# Patient Record
Sex: FEMALE | Race: WHITE | Employment: OTHER | URBAN - METROPOLITAN AREA
[De-identification: names, ages, dates, MRNs, and addresses within clinical notes are randomized per-mention and may not be internally consistent; named-entity substitution may affect disease eponyms.]

---

## 2017-03-30 ENCOUNTER — TELEPHONE (OUTPATIENT)
Dept: FAMILY MEDICINE CLINIC | Age: 72
End: 2017-03-30

## 2017-04-06 RX ORDER — AMLODIPINE BESYLATE 5 MG/1
TABLET ORAL
Qty: 90 TABLET | Refills: 0 | Status: SHIPPED | OUTPATIENT
Start: 2017-04-06 | End: 2017-04-13 | Stop reason: SDUPTHER

## 2017-04-13 ENCOUNTER — TELEPHONE (OUTPATIENT)
Dept: FAMILY MEDICINE CLINIC | Age: 72
End: 2017-04-13

## 2017-04-13 ENCOUNTER — OFFICE VISIT (OUTPATIENT)
Dept: FAMILY MEDICINE CLINIC | Age: 72
End: 2017-04-13

## 2017-04-13 VITALS
HEIGHT: 65 IN | BODY MASS INDEX: 34.16 KG/M2 | TEMPERATURE: 98.2 F | WEIGHT: 205 LBS | DIASTOLIC BLOOD PRESSURE: 76 MMHG | HEART RATE: 80 BPM | RESPIRATION RATE: 18 BRPM | SYSTOLIC BLOOD PRESSURE: 118 MMHG

## 2017-04-13 DIAGNOSIS — Z12.11 COLON CANCER SCREENING: ICD-10-CM

## 2017-04-13 DIAGNOSIS — E78.5 HYPERLIPIDEMIA, UNSPECIFIED HYPERLIPIDEMIA TYPE: ICD-10-CM

## 2017-04-13 DIAGNOSIS — I10 ESSENTIAL HYPERTENSION: Primary | Chronic | ICD-10-CM

## 2017-04-13 DIAGNOSIS — Z11.59 NEED FOR HEPATITIS C SCREENING TEST: ICD-10-CM

## 2017-04-13 DIAGNOSIS — C44.91 BCC (BASAL CELL CARCINOMA OF SKIN): ICD-10-CM

## 2017-04-13 PROCEDURE — 1036F TOBACCO NON-USER: CPT | Performed by: FAMILY MEDICINE

## 2017-04-13 PROCEDURE — G8400 PT W/DXA NO RESULTS DOC: HCPCS | Performed by: FAMILY MEDICINE

## 2017-04-13 PROCEDURE — 3014F SCREEN MAMMO DOC REV: CPT | Performed by: FAMILY MEDICINE

## 2017-04-13 PROCEDURE — 1123F ACP DISCUSS/DSCN MKR DOCD: CPT | Performed by: FAMILY MEDICINE

## 2017-04-13 PROCEDURE — 99213 OFFICE O/P EST LOW 20 MIN: CPT | Performed by: FAMILY MEDICINE

## 2017-04-13 PROCEDURE — 3017F COLORECTAL CA SCREEN DOC REV: CPT | Performed by: FAMILY MEDICINE

## 2017-04-13 PROCEDURE — 1090F PRES/ABSN URINE INCON ASSESS: CPT | Performed by: FAMILY MEDICINE

## 2017-04-13 PROCEDURE — 4040F PNEUMOC VAC/ADMIN/RCVD: CPT | Performed by: FAMILY MEDICINE

## 2017-04-13 PROCEDURE — G8427 DOCREV CUR MEDS BY ELIG CLIN: HCPCS | Performed by: FAMILY MEDICINE

## 2017-04-13 PROCEDURE — G8417 CALC BMI ABV UP PARAM F/U: HCPCS | Performed by: FAMILY MEDICINE

## 2017-04-13 RX ORDER — AMLODIPINE BESYLATE 5 MG/1
TABLET ORAL
Qty: 90 TABLET | Refills: 3 | Status: SHIPPED | OUTPATIENT
Start: 2017-04-13 | End: 2018-03-26 | Stop reason: SDUPTHER

## 2017-04-13 RX ORDER — INFLUENZA A VIRUS A/MICHIGAN/45/2015 X-275 (H1N1) ANTIGEN (FORMALDEHYDE INACTIVATED), INFLUENZA A VIRUS A/SINGAPORE/INFIMH-16-0019/2016 IVR-186 (H3N2) ANTIGEN (FORMALDEHYDE INACTIVATED), AND INFLUENZA B VIRUS B/MARYLAND/15/2016 BX-69A (A B/COLORADO/6/2017-LIKE VIRUS) ANTIGEN (FORMALDEHYDE INACTIVATED) 60; 60; 60 UG/.5ML; UG/.5ML; UG/.5ML
0.5 INJECTION, SUSPENSION INTRAMUSCULAR ONCE
Refills: 0 | COMMUNITY
Start: 2017-01-23 | End: 2018-03-26 | Stop reason: ALTCHOICE

## 2017-04-13 RX ORDER — PREDNISONE 20 MG/1
40 TABLET ORAL DAILY
Qty: 10 TABLET | Refills: 0 | Status: SHIPPED | OUTPATIENT
Start: 2017-04-13 | End: 2017-04-18

## 2017-04-13 RX ORDER — ATORVASTATIN CALCIUM 20 MG/1
TABLET, FILM COATED ORAL
Qty: 90 TABLET | Refills: 3 | Status: SHIPPED | OUTPATIENT
Start: 2017-04-13 | End: 2018-03-26 | Stop reason: SDUPTHER

## 2017-04-13 RX ORDER — BUSPIRONE HYDROCHLORIDE 15 MG/1
15 TABLET ORAL 2 TIMES DAILY
Qty: 180 TABLET | Refills: 3 | Status: SHIPPED | OUTPATIENT
Start: 2017-04-13 | End: 2018-03-26

## 2017-04-13 RX ORDER — LOSARTAN POTASSIUM 50 MG/1
TABLET ORAL
Qty: 90 TABLET | Refills: 3 | Status: SHIPPED | OUTPATIENT
Start: 2017-04-13 | End: 2018-03-26 | Stop reason: SDUPTHER

## 2017-04-13 RX ORDER — BUPROPION HYDROCHLORIDE 300 MG/1
TABLET ORAL
Qty: 90 TABLET | Refills: 3 | Status: SHIPPED | OUTPATIENT
Start: 2017-04-13 | End: 2018-03-26

## 2017-04-13 RX ORDER — PANTOPRAZOLE SODIUM 40 MG/1
40 TABLET, DELAYED RELEASE ORAL DAILY
Qty: 90 TABLET | Refills: 3 | Status: SHIPPED | OUTPATIENT
Start: 2017-04-13 | End: 2018-03-26 | Stop reason: SDUPTHER

## 2017-04-13 RX ORDER — DULOXETIN HYDROCHLORIDE 60 MG/1
CAPSULE, DELAYED RELEASE ORAL
Qty: 180 CAPSULE | Refills: 3 | Status: SHIPPED | OUTPATIENT
Start: 2017-04-13 | End: 2018-03-26 | Stop reason: SDUPTHER

## 2017-04-20 ASSESSMENT — ENCOUNTER SYMPTOMS
RHINORRHEA: 0
CHEST TIGHTNESS: 0
BACK PAIN: 0
SORE THROAT: 0
COUGH: 0
EYES NEGATIVE: 1
SHORTNESS OF BREATH: 0
NAUSEA: 0
DIARRHEA: 0
ABDOMINAL PAIN: 0
VOMITING: 0

## 2017-04-29 LAB
ABSOLUTE BASO #: 0.1 K/UL (ref 0–0.1)
ABSOLUTE EOS #: 0.3 K/UL (ref 0.1–0.4)
ABSOLUTE LYMPH #: 1.4 K/UL (ref 0.8–5.2)
ABSOLUTE MONO #: 0.5 K/UL (ref 0.1–0.9)
ABSOLUTE NEUT #: 9.8 K/UL (ref 1.3–9.1)
ALBUMIN SERPL-MCNC: 4.1 G/DL (ref 3.2–5.3)
ALK PHOSPHATASE: 105 IU/L (ref 35–121)
ALT SERPL-CCNC: 20 IU/L (ref 5–59)
ANION GAP SERPL CALCULATED.3IONS-SCNC: 12 MMOL/L
AST SERPL-CCNC: 15 IU/L (ref 10–42)
BASOPHILS RELATIVE PERCENT: 0.6 %
BILIRUB SERPL-MCNC: 0.5 MG/DL (ref 0.2–1.3)
BUN BLDV-MCNC: 18 MG/DL (ref 10–20)
CALCIUM SERPL-MCNC: 9.2 MG/DL (ref 8.7–10.8)
CHLORIDE BLD-SCNC: 103 MMOL/L (ref 95–111)
CHOLESTEROL/HDL RATIO: 3.3
CHOLESTEROL: 189 MG/DL
CO2: 30 MMOL/L (ref 21–32)
CREAT SERPL-MCNC: 1 MG/DL (ref 0.5–1.3)
EGFR AFRICAN AMERICAN: 67
EGFR IF NONAFRICAN AMERICAN: 55
EOSINOPHILS RELATIVE PERCENT: 2.2 %
GLUCOSE: 112 MG/DL (ref 70–100)
HCT VFR BLD CALC: 45.6 % (ref 36–48)
HDLC SERPL-MCNC: 57 MG/DL (ref 40–60)
HEMOGLOBIN: 15 G/DL (ref 12–16)
HEPATITIS C ANTIBODY: NEGATIVE
LDL CHOLESTEROL CALCULATED: 109 MG/DL
LDL/HDL RATIO: 1.9
LYMPHOCYTE %: 11.2 %
MCH RBC QN AUTO: 28.5 PG (ref 27–34)
MCHC RBC AUTO-ENTMCNC: 32.9 G/DL (ref 31–36)
MCV RBC AUTO: 86.7 FL (ref 80–100)
MONOCYTES # BLD: 4.2 %
NEUTROPHILS RELATIVE PERCENT: 81.3 %
PDW BLD-RTO: 13.6 % (ref 10.8–14.8)
PLATELETS: 225 K/UL (ref 150–450)
POTASSIUM SERPL-SCNC: 4.4 MMOL/L (ref 3.5–5.4)
RBC: 5.26 M/UL (ref 4–5.5)
SODIUM BLD-SCNC: 141 MMOL/L (ref 134–147)
TOTAL PROTEIN: 6.9 G/DL (ref 5.8–8)
TRIGL SERPL-MCNC: 116 MG/DL
VLDLC SERPL CALC-MCNC: 23 MG/DL
WBC: 12 K/UL (ref 3.7–10.8)

## 2017-05-02 ENCOUNTER — TELEPHONE (OUTPATIENT)
Dept: FAMILY MEDICINE CLINIC | Age: 72
End: 2017-05-02

## 2017-05-02 DIAGNOSIS — Z12.11 COLON CANCER SCREENING: ICD-10-CM

## 2017-05-02 LAB
CONTROL: ABNORMAL
HEMOCCULT STL QL: POSITIVE

## 2017-05-02 PROCEDURE — 82274 ASSAY TEST FOR BLOOD FECAL: CPT | Performed by: FAMILY MEDICINE

## 2017-05-03 ENCOUNTER — TELEPHONE (OUTPATIENT)
Dept: FAMILY MEDICINE CLINIC | Age: 72
End: 2017-05-03

## 2017-05-03 DIAGNOSIS — R19.5 POSITIVE FIT (FECAL IMMUNOCHEMICAL TEST): Primary | ICD-10-CM

## 2018-03-26 ENCOUNTER — OFFICE VISIT (OUTPATIENT)
Dept: FAMILY MEDICINE CLINIC | Age: 73
End: 2018-03-26
Payer: MEDICARE

## 2018-03-26 VITALS
DIASTOLIC BLOOD PRESSURE: 58 MMHG | HEART RATE: 96 BPM | WEIGHT: 190.2 LBS | TEMPERATURE: 98 F | BODY MASS INDEX: 32.47 KG/M2 | RESPIRATION RATE: 22 BRPM | HEIGHT: 64 IN | SYSTOLIC BLOOD PRESSURE: 106 MMHG

## 2018-03-26 DIAGNOSIS — E78.5 HYPERLIPIDEMIA, UNSPECIFIED HYPERLIPIDEMIA TYPE: ICD-10-CM

## 2018-03-26 DIAGNOSIS — I10 ESSENTIAL HYPERTENSION: Chronic | ICD-10-CM

## 2018-03-26 DIAGNOSIS — B96.89 ACUTE BACTERIAL SINUSITIS: Primary | ICD-10-CM

## 2018-03-26 DIAGNOSIS — F33.42 RECURRENT MAJOR DEPRESSIVE EPISODES, IN FULL REMISSION (HCC): ICD-10-CM

## 2018-03-26 DIAGNOSIS — J01.90 ACUTE BACTERIAL SINUSITIS: Primary | ICD-10-CM

## 2018-03-26 PROCEDURE — 1090F PRES/ABSN URINE INCON ASSESS: CPT | Performed by: FAMILY MEDICINE

## 2018-03-26 PROCEDURE — 1036F TOBACCO NON-USER: CPT | Performed by: FAMILY MEDICINE

## 2018-03-26 PROCEDURE — G8427 DOCREV CUR MEDS BY ELIG CLIN: HCPCS | Performed by: FAMILY MEDICINE

## 2018-03-26 PROCEDURE — 3017F COLORECTAL CA SCREEN DOC REV: CPT | Performed by: FAMILY MEDICINE

## 2018-03-26 PROCEDURE — 3014F SCREEN MAMMO DOC REV: CPT | Performed by: FAMILY MEDICINE

## 2018-03-26 PROCEDURE — 99213 OFFICE O/P EST LOW 20 MIN: CPT | Performed by: FAMILY MEDICINE

## 2018-03-26 PROCEDURE — G8482 FLU IMMUNIZE ORDER/ADMIN: HCPCS | Performed by: FAMILY MEDICINE

## 2018-03-26 PROCEDURE — G8400 PT W/DXA NO RESULTS DOC: HCPCS | Performed by: FAMILY MEDICINE

## 2018-03-26 PROCEDURE — 1123F ACP DISCUSS/DSCN MKR DOCD: CPT | Performed by: FAMILY MEDICINE

## 2018-03-26 PROCEDURE — G8417 CALC BMI ABV UP PARAM F/U: HCPCS | Performed by: FAMILY MEDICINE

## 2018-03-26 PROCEDURE — 4040F PNEUMOC VAC/ADMIN/RCVD: CPT | Performed by: FAMILY MEDICINE

## 2018-03-26 RX ORDER — PANTOPRAZOLE SODIUM 40 MG/1
40 TABLET, DELAYED RELEASE ORAL DAILY
Qty: 90 TABLET | Refills: 3 | Status: SHIPPED | OUTPATIENT
Start: 2018-03-26 | End: 2019-02-14 | Stop reason: SDUPTHER

## 2018-03-26 RX ORDER — ATORVASTATIN CALCIUM 20 MG/1
TABLET, FILM COATED ORAL
Qty: 90 TABLET | Refills: 3 | Status: SHIPPED | OUTPATIENT
Start: 2018-03-26 | End: 2019-02-14 | Stop reason: SDUPTHER

## 2018-03-26 RX ORDER — LOSARTAN POTASSIUM 50 MG/1
TABLET ORAL
Qty: 90 TABLET | Refills: 3 | Status: SHIPPED | OUTPATIENT
Start: 2018-03-26 | End: 2019-02-14 | Stop reason: SDUPTHER

## 2018-03-26 RX ORDER — AMLODIPINE BESYLATE 5 MG/1
TABLET ORAL
Qty: 90 TABLET | Refills: 3 | Status: SHIPPED | OUTPATIENT
Start: 2018-03-26 | End: 2019-02-14 | Stop reason: SDUPTHER

## 2018-03-26 RX ORDER — AMOXICILLIN AND CLAVULANATE POTASSIUM 875; 125 MG/1; MG/1
1 TABLET, FILM COATED ORAL EVERY 12 HOURS
Qty: 20 TABLET | Refills: 0 | Status: SHIPPED | OUTPATIENT
Start: 2018-03-26 | End: 2018-04-05

## 2018-03-26 RX ORDER — DULOXETIN HYDROCHLORIDE 60 MG/1
CAPSULE, DELAYED RELEASE ORAL
Qty: 180 CAPSULE | Refills: 3 | Status: SHIPPED | OUTPATIENT
Start: 2018-03-26 | End: 2019-05-22 | Stop reason: SDUPTHER

## 2018-03-26 ASSESSMENT — PATIENT HEALTH QUESTIONNAIRE - PHQ9
SUM OF ALL RESPONSES TO PHQ QUESTIONS 1-9: 0
2. FEELING DOWN, DEPRESSED OR HOPELESS: 0
1. LITTLE INTEREST OR PLEASURE IN DOING THINGS: 0
SUM OF ALL RESPONSES TO PHQ9 QUESTIONS 1 & 2: 0

## 2018-03-26 ASSESSMENT — ENCOUNTER SYMPTOMS
NAUSEA: 0
SINUS PRESSURE: 1
BACK PAIN: 0
CHEST TIGHTNESS: 0
VOMITING: 0
SINUS PAIN: 1
RHINORRHEA: 0
SORE THROAT: 1
ABDOMINAL PAIN: 0
COUGH: 1
SHORTNESS OF BREATH: 0
DIARRHEA: 0
EYES NEGATIVE: 1

## 2018-03-26 NOTE — PROGRESS NOTES
[facility]  [facility]  [address] [place] [address] Rd., [phone]  Dept: [phone]  Dept Fax: [phone]: [phone]  PROGRESS NOTE      Visit Date: 3/26/2018    Rayshawn French is a 81 y.o. female who presents today for:  Chief Complaint   Patient presents with    Cough     lightheaded, headache, chills - started last Thursday    Medication Refill         Subjective:  HPI  Patient comes in with five-day history of frontal headache posterior nasal drainage sore throat cough intermittently productive sputum ear fullness and chills. No over-the-counter medications. Symptoms are progressing    Review of Systems   Constitutional: Negative for activity change, appetite change, fatigue and fever. HENT: Positive for congestion, postnasal drip, sinus pain, sinus pressure and sore throat. Negative for rhinorrhea. Eyes: Negative. Respiratory: Positive for cough. Negative for chest tightness and shortness of breath. Cardiovascular: Negative for chest pain and palpitations. Gastrointestinal: Negative for abdominal pain, diarrhea, nausea and vomiting. Genitourinary: Negative for dysuria and urgency. Musculoskeletal: Negative for arthralgias and back pain. Neurological: Positive for dizziness and headaches. Psychiatric/Behavioral: Negative for dysphoric mood. The patient is not nervous/anxious.       Past Medical History:   Diagnosis Date    Depression     Hypertension       Past Surgical History:   Procedure Laterality Date    SKIN CANCER EXCISION N/A      Family History   Problem Relation Age of Onset    Cancer Sister      breast    High Cholesterol Mother     Heart Disease Father     Cancer Father      Bladder Cancer    Stroke Father     Cancer Sister      Breast Cancer    Cancer Sister      Breast Cancer    Cancer Maternal Grandmother      Breast Cancer    Cancer Paternal Grandmother      Breast Cancer     Social History   Substance Use Topics    Future     Standing Expiration Date:   3/26/2019       Patient given educational materials - see patient instructions. Discussed use, benefit, and side effects of prescribed medications. All patient questions answered. Pt voiced understanding. Reviewed health maintenance. Patient agreed with treatment plan. Follow up as directed. **This report has been created using voice recognition software. It may contain minor errors which are inherent in voice recognition technology. **       Electronically signed by Francisca Humphreys MD on 3/26/2018 at 9:05 AM

## 2018-04-26 ENCOUNTER — HOSPITAL ENCOUNTER (OUTPATIENT)
Dept: WOMENS IMAGING | Age: 73
Discharge: HOME | End: 2018-04-26
Payer: MEDICARE

## 2018-04-26 DIAGNOSIS — Z12.39 BREAST SCREENING: ICD-10-CM

## 2018-04-26 PROCEDURE — 77063 BREAST TOMOSYNTHESIS BI: CPT

## 2018-04-28 LAB
ABSOLUTE BASO #: 0.1 K/UL (ref 0–0.1)
ABSOLUTE EOS #: 0.3 K/UL (ref 0.1–0.4)
ABSOLUTE LYMPH #: 1.3 K/UL (ref 0.8–5.2)
ABSOLUTE MONO #: 0.3 K/UL (ref 0.1–0.9)
ABSOLUTE NEUT #: 5.4 K/UL (ref 1.3–9.1)
ALBUMIN SERPL-MCNC: 4.3 G/DL (ref 3.5–5.2)
ALK PHOSPHATASE: 101 U/L (ref 30–146)
ALT SERPL-CCNC: 20 U/L (ref 5–40)
ANION GAP SERPL CALCULATED.3IONS-SCNC: 13 MEQ/L (ref 10–19)
AST SERPL-CCNC: 16 U/L (ref 9–40)
BASOPHILS RELATIVE PERCENT: 1.2 %
BILIRUB SERPL-MCNC: 0.5 MG/DL
BUN BLDV-MCNC: 13 MG/DL (ref 8–23)
CALCIUM SERPL-MCNC: 9.3 MG/DL (ref 8.5–10.5)
CHLORIDE BLD-SCNC: 101 MEQ/L (ref 95–107)
CHOLESTEROL/HDL RATIO: 3.7
CHOLESTEROL: 203 MG/DL
CO2: 30 MEQ/L (ref 19–31)
COMMENT: ABNORMAL
CREAT SERPL-MCNC: 0.8 MG/DL (ref 0.6–1.3)
EGFR AFRICAN AMERICAN: 86.6 ML/MIN/1.73 M2
EGFR IF NONAFRICAN AMERICAN: 74.7 ML/MIN/1.73 M2
EOSINOPHILS RELATIVE PERCENT: 3.6 %
GLUCOSE: 116 MG/DL (ref 70–99)
HCT VFR BLD CALC: 47 % (ref 36–48)
HDLC SERPL-MCNC: 55 MG/DL
HEMOGLOBIN: 16 G/DL (ref 12–16)
LDL CHOLESTEROL CALCULATED: 113 MG/DL
LDL/HDL RATIO: 2.1
LYMPHOCYTE %: 17.5 %
MCH RBC QN AUTO: 28.9 PG (ref 27–34)
MCHC RBC AUTO-ENTMCNC: 34 G/DL (ref 31–36)
MCV RBC AUTO: 84.8 FL (ref 80–100)
MONOCYTES # BLD: 4.3 %
NEUTROPHILS RELATIVE PERCENT: 73 %
PDW BLD-RTO: 13.8 % (ref 10.8–14.8)
PLATELETS: 187 K/UL (ref 150–450)
POTASSIUM SERPL-SCNC: 4.7 MEQ/L (ref 3.5–5.4)
RBC: 5.54 M/UL (ref 4–5.5)
SODIUM BLD-SCNC: 144 MEQ/L (ref 135–146)
TOTAL PROTEIN: 6.8 G/DL (ref 6.1–8.3)
TRIGL SERPL-MCNC: 174 MG/DL
VLDLC SERPL CALC-MCNC: 35 MG/DL
WBC: 7.4 K/UL (ref 3.7–10.8)

## 2019-02-14 ENCOUNTER — OFFICE VISIT (OUTPATIENT)
Dept: FAMILY MEDICINE CLINIC | Age: 74
End: 2019-02-14
Payer: MEDICARE

## 2019-02-14 VITALS
HEART RATE: 66 BPM | BODY MASS INDEX: 33.76 KG/M2 | TEMPERATURE: 98.1 F | RESPIRATION RATE: 18 BRPM | DIASTOLIC BLOOD PRESSURE: 68 MMHG | SYSTOLIC BLOOD PRESSURE: 130 MMHG | WEIGHT: 202.6 LBS | HEIGHT: 65 IN

## 2019-02-14 DIAGNOSIS — F33.42 MAJOR DEPRESSIVE DISORDER, RECURRENT, IN FULL REMISSION (HCC): ICD-10-CM

## 2019-02-14 DIAGNOSIS — E78.5 HYPERLIPIDEMIA, UNSPECIFIED HYPERLIPIDEMIA TYPE: ICD-10-CM

## 2019-02-14 DIAGNOSIS — I10 ESSENTIAL HYPERTENSION: Primary | Chronic | ICD-10-CM

## 2019-02-14 PROCEDURE — G8417 CALC BMI ABV UP PARAM F/U: HCPCS | Performed by: FAMILY MEDICINE

## 2019-02-14 PROCEDURE — 1036F TOBACCO NON-USER: CPT | Performed by: FAMILY MEDICINE

## 2019-02-14 PROCEDURE — G8427 DOCREV CUR MEDS BY ELIG CLIN: HCPCS | Performed by: FAMILY MEDICINE

## 2019-02-14 PROCEDURE — 1123F ACP DISCUSS/DSCN MKR DOCD: CPT | Performed by: FAMILY MEDICINE

## 2019-02-14 PROCEDURE — 4040F PNEUMOC VAC/ADMIN/RCVD: CPT | Performed by: FAMILY MEDICINE

## 2019-02-14 PROCEDURE — G8484 FLU IMMUNIZE NO ADMIN: HCPCS | Performed by: FAMILY MEDICINE

## 2019-02-14 PROCEDURE — 3017F COLORECTAL CA SCREEN DOC REV: CPT | Performed by: FAMILY MEDICINE

## 2019-02-14 PROCEDURE — 1101F PT FALLS ASSESS-DOCD LE1/YR: CPT | Performed by: FAMILY MEDICINE

## 2019-02-14 PROCEDURE — G8400 PT W/DXA NO RESULTS DOC: HCPCS | Performed by: FAMILY MEDICINE

## 2019-02-14 PROCEDURE — 1090F PRES/ABSN URINE INCON ASSESS: CPT | Performed by: FAMILY MEDICINE

## 2019-02-14 PROCEDURE — 99213 OFFICE O/P EST LOW 20 MIN: CPT | Performed by: FAMILY MEDICINE

## 2019-02-14 RX ORDER — LOSARTAN POTASSIUM 50 MG/1
TABLET ORAL
Qty: 90 TABLET | Refills: 3 | Status: SHIPPED | OUTPATIENT
Start: 2019-02-14 | End: 2020-03-26 | Stop reason: SDUPTHER

## 2019-02-14 RX ORDER — AMLODIPINE BESYLATE 5 MG/1
TABLET ORAL
Qty: 90 TABLET | Refills: 3 | Status: SHIPPED | OUTPATIENT
Start: 2019-02-14 | End: 2020-03-26 | Stop reason: SDUPTHER

## 2019-02-14 RX ORDER — PANTOPRAZOLE SODIUM 40 MG/1
40 TABLET, DELAYED RELEASE ORAL DAILY
Qty: 90 TABLET | Refills: 3 | Status: SHIPPED | OUTPATIENT
Start: 2019-02-14 | End: 2020-03-26 | Stop reason: SDUPTHER

## 2019-02-14 RX ORDER — ATORVASTATIN CALCIUM 20 MG/1
TABLET, FILM COATED ORAL
Qty: 90 TABLET | Refills: 3 | Status: SHIPPED | OUTPATIENT
Start: 2019-02-14 | End: 2020-07-08 | Stop reason: SDUPTHER

## 2019-02-14 ASSESSMENT — PATIENT HEALTH QUESTIONNAIRE - PHQ9
2. FEELING DOWN, DEPRESSED OR HOPELESS: 0
1. LITTLE INTEREST OR PLEASURE IN DOING THINGS: 1
SUM OF ALL RESPONSES TO PHQ QUESTIONS 1-9: 1
SUM OF ALL RESPONSES TO PHQ9 QUESTIONS 1 & 2: 1
SUM OF ALL RESPONSES TO PHQ QUESTIONS 1-9: 1

## 2019-02-14 ASSESSMENT — ENCOUNTER SYMPTOMS
VOMITING: 0
SORE THROAT: 0
RHINORRHEA: 0
ABDOMINAL PAIN: 0
DIARRHEA: 0
CONSTIPATION: 0
NAUSEA: 0
BACK PAIN: 0
COUGH: 1

## 2019-02-28 LAB
ABSOLUTE BASO #: 0.1 K/UL (ref 0–0.1)
ABSOLUTE EOS #: 0.3 K/UL (ref 0.1–0.4)
ABSOLUTE LYMPH #: 1.3 K/UL (ref 0.8–5.2)
ABSOLUTE MONO #: 0.3 K/UL (ref 0.1–0.9)
ABSOLUTE NEUT #: 5.7 K/UL (ref 1.3–9.1)
ALBUMIN SERPL-MCNC: 4.2 G/DL (ref 3.5–5.2)
ALK PHOSPHATASE: 110 U/L (ref 30–146)
ALT SERPL-CCNC: 24 U/L (ref 5–40)
ANION GAP SERPL CALCULATED.3IONS-SCNC: 11 MEQ/L (ref 10–19)
AST SERPL-CCNC: 19 U/L (ref 9–40)
BASOPHILS RELATIVE PERCENT: 1.2 %
BILIRUB SERPL-MCNC: 0.7 MG/DL
BUN BLDV-MCNC: 17 MG/DL (ref 8–23)
CALCIUM SERPL-MCNC: 9.5 MG/DL (ref 8.5–10.5)
CHLORIDE BLD-SCNC: 101 MEQ/L (ref 95–107)
CHOLESTEROL/HDL RATIO: 3.7
CHOLESTEROL: 198 MG/DL
CO2: 30 MEQ/L (ref 19–31)
CREAT SERPL-MCNC: 0.8 MG/DL (ref 0.6–1.3)
EGFR AFRICAN AMERICAN: 86 ML/MIN/1.73 M2
EGFR IF NONAFRICAN AMERICAN: 74.2 ML/MIN/1.73 M2
EOSINOPHILS RELATIVE PERCENT: 3.9 %
GLUCOSE: 167 MG/DL (ref 70–99)
HCT VFR BLD CALC: 46.9 % (ref 36–48)
HDLC SERPL-MCNC: 52.9 MG/DL
HEMOGLOBIN: 15.9 G/DL (ref 12–16)
LDL CHOLESTEROL CALCULATED: 118 MG/DL
LDL/HDL RATIO: 2.2
LYMPHOCYTE %: 16.4 %
MCH RBC QN AUTO: 29.1 PG (ref 27–34)
MCHC RBC AUTO-ENTMCNC: 33.9 G/DL (ref 31–36)
MCV RBC AUTO: 85.7 FL (ref 80–100)
MONOCYTES # BLD: 4.3 %
NEUTROPHILS RELATIVE PERCENT: 73.2 %
PDW BLD-RTO: 12.7 % (ref 10.8–14.8)
PLATELETS: 185 K/UL (ref 150–450)
POTASSIUM SERPL-SCNC: 4.8 MEQ/L (ref 3.5–5.4)
RBC: 5.47 M/UL (ref 4–5.5)
SODIUM BLD-SCNC: 142 MEQ/L (ref 135–146)
TOTAL PROTEIN: 7 G/DL (ref 6.1–8.3)
TRIGL SERPL-MCNC: 137 MG/DL
VLDLC SERPL CALC-MCNC: 27 MG/DL
WBC: 7.8 K/UL (ref 3.7–10.8)

## 2019-04-29 ENCOUNTER — HOSPITAL ENCOUNTER (OUTPATIENT)
Dept: WOMENS IMAGING | Age: 74
Discharge: HOME | End: 2019-04-29
Payer: MEDICARE

## 2019-04-29 DIAGNOSIS — Z12.31 VISIT FOR SCREENING MAMMOGRAM: ICD-10-CM

## 2019-04-29 PROCEDURE — 77063 BREAST TOMOSYNTHESIS BI: CPT

## 2019-05-22 NOTE — TELEPHONE ENCOUNTER
Patient is calling in for refills of her cymbalta 60 mg that she said she is only taking one capsule once daily. She is asking for 90 day supplies to [facility].

## 2019-05-23 RX ORDER — DULOXETIN HYDROCHLORIDE 60 MG/1
CAPSULE, DELAYED RELEASE ORAL
Qty: 90 CAPSULE | Refills: 3 | Status: SHIPPED | OUTPATIENT
Start: 2019-05-23 | End: 2020-07-08 | Stop reason: SDUPTHER

## 2019-12-03 RX ORDER — BUSPIRONE HYDROCHLORIDE 15 MG/1
15 TABLET ORAL DAILY
Qty: 90 TABLET | Refills: 0 | Status: SHIPPED | OUTPATIENT
Start: 2019-12-03 | End: 2020-03-26 | Stop reason: SDUPTHER

## 2020-03-26 RX ORDER — BUSPIRONE HYDROCHLORIDE 15 MG/1
15 TABLET ORAL DAILY
Qty: 90 TABLET | Refills: 0 | Status: SHIPPED | OUTPATIENT
Start: 2020-03-26 | End: 2020-07-08 | Stop reason: SDUPTHER

## 2020-03-26 RX ORDER — AMLODIPINE BESYLATE 5 MG/1
TABLET ORAL
Qty: 90 TABLET | Refills: 0 | Status: SHIPPED | OUTPATIENT
Start: 2020-03-26 | End: 2020-07-08 | Stop reason: SDUPTHER

## 2020-03-26 RX ORDER — PANTOPRAZOLE SODIUM 40 MG/1
40 TABLET, DELAYED RELEASE ORAL DAILY
Qty: 90 TABLET | Refills: 0 | Status: SHIPPED | OUTPATIENT
Start: 2020-03-26 | End: 2020-07-08 | Stop reason: SDUPTHER

## 2020-03-26 RX ORDER — LOSARTAN POTASSIUM 50 MG/1
TABLET ORAL
Qty: 90 TABLET | Refills: 0 | Status: SHIPPED | OUTPATIENT
Start: 2020-03-26 | End: 2020-07-08 | Stop reason: SDUPTHER

## 2020-07-02 NOTE — TELEPHONE ENCOUNTER
Lucinda Curtis called requesting a refill on the following medications:  Requested Prescriptions     Pending Prescriptions Disp Refills    atorvastatin (LIPITOR) 20 MG tablet 90 tablet 3     Sig: TAKE ONE TABLET BY MOUTH ONCE DAILY    amLODIPine (NORVASC) 5 MG tablet 90 tablet 0     Sig: TAKE ONE TABLET BY MOUTH ONCE DAILY    pantoprazole (PROTONIX) 40 MG tablet 90 tablet 0     Sig: Take 1 tablet by mouth daily    busPIRone (BUSPAR) 15 MG tablet 90 tablet 0     Sig: Take 15 mg by mouth daily    losartan (COZAAR) 50 MG tablet 90 tablet 0     Sig: TAKE ONE TABLET BY MOUTH ONCE DAILY     Pharmacy verified:  [facility] [address]      Date of last visit: 2/14/19  Date of next visit (if applicable): Visit date not found

## 2020-07-07 RX ORDER — LOSARTAN POTASSIUM 50 MG/1
TABLET ORAL
Qty: 90 TABLET | Refills: 0 | OUTPATIENT
Start: 2020-07-07

## 2020-07-07 RX ORDER — AMLODIPINE BESYLATE 5 MG/1
TABLET ORAL
Qty: 90 TABLET | Refills: 0 | OUTPATIENT
Start: 2020-07-07

## 2020-07-07 RX ORDER — ATORVASTATIN CALCIUM 20 MG/1
TABLET, FILM COATED ORAL
Qty: 90 TABLET | Refills: 3 | OUTPATIENT
Start: 2020-07-07

## 2020-07-07 RX ORDER — PANTOPRAZOLE SODIUM 40 MG/1
40 TABLET, DELAYED RELEASE ORAL DAILY
Qty: 90 TABLET | Refills: 0 | OUTPATIENT
Start: 2020-07-07

## 2020-07-07 RX ORDER — BUSPIRONE HYDROCHLORIDE 15 MG/1
15 TABLET ORAL DAILY
Qty: 90 TABLET | Refills: 0 | OUTPATIENT
Start: 2020-07-07

## 2020-07-08 ENCOUNTER — OFFICE VISIT (OUTPATIENT)
Dept: FAMILY MEDICINE CLINIC | Age: 75
End: 2020-07-08
Payer: MEDICARE

## 2020-07-08 VITALS
HEART RATE: 84 BPM | SYSTOLIC BLOOD PRESSURE: 112 MMHG | WEIGHT: 175 LBS | BODY MASS INDEX: 29.88 KG/M2 | DIASTOLIC BLOOD PRESSURE: 70 MMHG | HEIGHT: 64 IN | RESPIRATION RATE: 20 BRPM | TEMPERATURE: 97.3 F

## 2020-07-08 PROCEDURE — G8400 PT W/DXA NO RESULTS DOC: HCPCS | Performed by: NURSE PRACTITIONER

## 2020-07-08 PROCEDURE — 4040F PNEUMOC VAC/ADMIN/RCVD: CPT | Performed by: NURSE PRACTITIONER

## 2020-07-08 PROCEDURE — 1036F TOBACCO NON-USER: CPT | Performed by: NURSE PRACTITIONER

## 2020-07-08 PROCEDURE — 1123F ACP DISCUSS/DSCN MKR DOCD: CPT | Performed by: NURSE PRACTITIONER

## 2020-07-08 PROCEDURE — 1090F PRES/ABSN URINE INCON ASSESS: CPT | Performed by: NURSE PRACTITIONER

## 2020-07-08 PROCEDURE — 99214 OFFICE O/P EST MOD 30 MIN: CPT | Performed by: NURSE PRACTITIONER

## 2020-07-08 PROCEDURE — G8417 CALC BMI ABV UP PARAM F/U: HCPCS | Performed by: NURSE PRACTITIONER

## 2020-07-08 PROCEDURE — G8427 DOCREV CUR MEDS BY ELIG CLIN: HCPCS | Performed by: NURSE PRACTITIONER

## 2020-07-08 PROCEDURE — 3017F COLORECTAL CA SCREEN DOC REV: CPT | Performed by: NURSE PRACTITIONER

## 2020-07-08 RX ORDER — DULOXETIN HYDROCHLORIDE 60 MG/1
CAPSULE, DELAYED RELEASE ORAL
Qty: 90 CAPSULE | Refills: 3 | Status: SHIPPED | OUTPATIENT
Start: 2020-07-08 | End: 2021-10-04

## 2020-07-08 RX ORDER — LOSARTAN POTASSIUM 50 MG/1
TABLET ORAL
Qty: 90 TABLET | Refills: 3 | Status: SHIPPED | OUTPATIENT
Start: 2020-07-08 | End: 2021-10-04

## 2020-07-08 RX ORDER — AMLODIPINE BESYLATE 5 MG/1
TABLET ORAL
Qty: 90 TABLET | Refills: 3 | Status: SHIPPED | OUTPATIENT
Start: 2020-07-08 | End: 2021-10-04

## 2020-07-08 RX ORDER — PANTOPRAZOLE SODIUM 40 MG/1
40 TABLET, DELAYED RELEASE ORAL DAILY
Qty: 90 TABLET | Refills: 3 | Status: SHIPPED | OUTPATIENT
Start: 2020-07-08 | End: 2021-10-04

## 2020-07-08 RX ORDER — BUSPIRONE HYDROCHLORIDE 15 MG/1
15 TABLET ORAL DAILY
Qty: 90 TABLET | Refills: 3 | Status: SHIPPED | OUTPATIENT
Start: 2020-07-08 | End: 2021-10-04

## 2020-07-08 RX ORDER — ATORVASTATIN CALCIUM 20 MG/1
TABLET, FILM COATED ORAL
Qty: 90 TABLET | Refills: 3 | Status: SHIPPED | OUTPATIENT
Start: 2020-07-08 | End: 2021-10-04

## 2020-07-08 NOTE — PROGRESS NOTES
[address] [place]hijit [place] Paume [facility] [address]  Dept: [phone]  Dept Fax: [phone]  Loc: [phone]  PROGRESS NOTE      VisitDate: 7/8/2020    Feroz Suazo is a 69 y.o. female who presents today for:     Chief Complaint   Patient presents with    Check-Up     HTN, Hyperlipidemia, Depression    Orders     due for labs, mammogram and dexa scan    Medication Refill         Subjective: Annual exam/checkup/refills. Is due for mammogram, bone density and routine labs. History of hypertension, hyperlipidemia, GERD, anxiety and depression. Reports mood stable. Denies any fever, illness, headaches, dizziness, syncope, chest pain, shortness of breath, abdominal pain, rash or edema. Review of Systems   All other systems reviewed and are negative.     Past Medical History:   Diagnosis Date    Depression     Hypertension       Past Surgical History:   Procedure Laterality Date    SKIN CANCER EXCISION N/A      Family History   Problem Relation Age of Onset    Cancer Sister         breast    High Cholesterol Mother     Heart Disease Father     Cancer Father         Bladder Cancer    Stroke Father     Cancer Sister         Breast Cancer    Cancer Sister         Breast Cancer    Cancer Maternal Grandmother         Breast Cancer    Cancer Paternal Grandmother         Breast Cancer     Social History     Tobacco Use    Smoking status: Never Smoker    Smokeless tobacco: Never Used   Substance Use Topics    Alcohol use: Yes     Comment: occasional      Current Outpatient Medications   Medication Sig Dispense Refill    busPIRone (BUSPAR) 15 MG tablet Take 15 mg by mouth daily 90 tablet 3    losartan (COZAAR) 50 MG tablet TAKE ONE TABLET BY MOUTH ONCE DAILY 90 tablet 3    pantoprazole (PROTONIX) 40 MG tablet Take 1 tablet by mouth daily 90 tablet 3    amLODIPine (NORVASC) 5 MG tablet TAKE ONE TABLET BY MOUTH ONCE DAILY 90 tablet 3    DULoxetine (CYMBALTA) 60 MG extended release capsule TAKE one CAPSULE BY MOUTH ONCE DAILY 90 capsule 3    atorvastatin (LIPITOR) 20 MG tablet TAKE ONE TABLET BY MOUTH ONCE DAILY 90 tablet 3     No current facility-administered medications for this visit. Allergies   Allergen Reactions    Lisinopril Other (See Comments)     Dry mouth    Sulfa Antibiotics Hives and Other (See Comments)     hypotension     Health Maintenance   Topic Date Due    DEXA (modify frequency per FRAX score)  10/05/2002    Shingles Vaccine (2 of 3) 12/20/2014    Annual Wellness Visit (AWV)  05/29/2019    Lipid screen  02/27/2020    Potassium monitoring  02/27/2020    Creatinine monitoring  02/27/2020    Flu vaccine (1) 09/01/2020    Breast cancer screen  04/29/2021    DTaP/Tdap/Td vaccine (2 - Td) 01/08/2026    Colon cancer screen colonoscopy  06/08/2027    Pneumococcal 65+ years Vaccine  Completed    Hepatitis C screen  Completed    Hepatitis A vaccine  Aged Out    Hepatitis B vaccine  Aged Out    Hib vaccine  Aged Out    Meningococcal (ACWY) vaccine  Aged Out         Objective:     Physical Exam  Vitals signs and nursing note reviewed. Constitutional:       Appearance: Normal appearance. She is well-developed and normal weight. HENT:      Head: Normocephalic. Right Ear: Tympanic membrane normal.      Left Ear: Tympanic membrane normal.      Nose: Nose normal.      Mouth/Throat:      Pharynx: Oropharynx is clear. Eyes:      Extraocular Movements: Extraocular movements intact. Conjunctiva/sclera: Conjunctivae normal.      Pupils: Pupils are equal, round, and reactive to light. Neck:      Musculoskeletal: Normal range of motion and neck supple. Cardiovascular:      Rate and Rhythm: Normal rate and regular rhythm. Pulses: Normal pulses. Heart sounds: Normal heart sounds. Pulmonary:      Effort: Pulmonary effort is normal.      Breath sounds: Normal breath sounds.    Abdominal: General: Bowel sounds are normal.      Palpations: Abdomen is soft. Musculoskeletal: Normal range of motion. Skin:     General: Skin is warm and dry. Neurological:      General: No focal deficit present. Mental Status: She is alert and oriented to person, place, and time. Psychiatric:         Mood and Affect: Mood normal.         Behavior: Behavior normal.         Thought Content: Thought content normal.         Judgment: Judgment normal.       /70   Pulse 84   Temp 97.3 °F (36.3 °C) (Temporal)   Resp 20   Ht 5' 4\" (1.626 m)   Wt 175 lb (79.4 kg)   BMI 30.04 kg/m²       Impression/Plan:  1. Essential hypertension    2. Depression, unspecified depression type    3. Anxiety    4. Mixed hyperlipidemia    5. Screening for breast cancer    6. Postmenopausal    7.  Gastroesophageal reflux disease, esophagitis presence not specified      Requested Prescriptions     Signed Prescriptions Disp Refills    busPIRone (BUSPAR) 15 MG tablet 90 tablet 3     Sig: Take 15 mg by mouth daily    losartan (COZAAR) 50 MG tablet 90 tablet 3     Sig: TAKE ONE TABLET BY MOUTH ONCE DAILY    pantoprazole (PROTONIX) 40 MG tablet 90 tablet 3     Sig: Take 1 tablet by mouth daily    amLODIPine (NORVASC) 5 MG tablet 90 tablet 3     Sig: TAKE ONE TABLET BY MOUTH ONCE DAILY    DULoxetine (CYMBALTA) 60 MG extended release capsule 90 capsule 3     Sig: TAKE one CAPSULE BY MOUTH ONCE DAILY    atorvastatin (LIPITOR) 20 MG tablet 90 tablet 3     Sig: TAKE ONE TABLET BY MOUTH ONCE DAILY     Orders Placed This Encounter   Procedures    JAY DIGITAL SCREEN W CAD BILATERAL     Standing Status:   Future     Standing Expiration Date:   7/8/2021    DEXA BONE DENSITY 2 SITES     Standing Status:   Future     Standing Expiration Date:   7/8/2021    Comprehensive Metabolic Panel     Standing Status:   Future     Standing Expiration Date:   7/8/2021    Lipid Panel     Standing Status:   Future     Standing Expiration Date: 7/8/2021     Order Specific Question:   Is Patient Fasting?/# of Hours     Answer:   yes/12       Patient giveneducational materials - see patient instructions. Discussed use, benefit, and side effects of prescribed medications. All patient questions answered. Pt voiced understanding. Reviewed health maintenance. Patient agreedwith treatment plan. Follow up as directed. Routine labs, mammogram and bone density. Refills provided. Continue meds. Continue medications as prescribed.  Educational information on above diagnosis  provided per AVS.      Electronically signed by REBECA Lopez CNP on 7/8/2020 at 11:49 AM

## 2020-08-20 ENCOUNTER — TELEPHONE (OUTPATIENT)
Dept: FAMILY MEDICINE CLINIC | Age: 75
End: 2020-08-20

## 2020-08-20 ENCOUNTER — NURSE ONLY (OUTPATIENT)
Dept: LAB | Age: 75
End: 2020-08-20

## 2020-08-20 LAB
ALBUMIN SERPL-MCNC: 4.1 G/DL (ref 3.5–5.1)
ALP BLD-CCNC: 116 U/L (ref 38–126)
ALT SERPL-CCNC: 19 U/L (ref 11–66)
ANION GAP SERPL CALCULATED.3IONS-SCNC: 14 MEQ/L (ref 8–16)
AST SERPL-CCNC: 20 U/L (ref 5–40)
BILIRUB SERPL-MCNC: 0.8 MG/DL (ref 0.3–1.2)
BUN BLDV-MCNC: 11 MG/DL (ref 7–22)
CALCIUM SERPL-MCNC: 8.8 MG/DL (ref 8.5–10.5)
CHLORIDE BLD-SCNC: 99 MEQ/L (ref 98–111)
CHOLESTEROL, TOTAL: 225 MG/DL (ref 100–199)
CO2: 26 MEQ/L (ref 23–33)
CREAT SERPL-MCNC: 0.7 MG/DL (ref 0.4–1.2)
GFR SERPL CREATININE-BSD FRML MDRD: 82 ML/MIN/1.73M2
GLUCOSE BLD-MCNC: 301 MG/DL (ref 70–108)
HDLC SERPL-MCNC: 39 MG/DL
LDL CHOLESTEROL CALCULATED: 144 MG/DL
POTASSIUM SERPL-SCNC: 3.5 MEQ/L (ref 3.5–5.2)
SODIUM BLD-SCNC: 139 MEQ/L (ref 135–145)
TOTAL PROTEIN: 6.5 G/DL (ref 6.1–8)
TRIGL SERPL-MCNC: 208 MG/DL (ref 0–199)

## 2020-08-20 NOTE — TELEPHONE ENCOUNTER
----- Message from REBECA Maxwell CNP sent at 8/20/2020  1:43 PM EDT -----  Elevated cholesterol and triglycerides. Fasting glucose is 301.   Appointment check A1c in office

## 2020-08-20 NOTE — LETTER
[address]  [address]  [facility] [address]  Phone: [phone]  Fax: [phone]    Sukhwinder Law MD        August 24, 2020    [address] [place] [id number] [facility]      Dear Marti Gutierrez: We have tried to contact you by phone multiple times regarding your lab results. Dr Syed Dennis would like to see you in office to discuss these. Please call [phone], option #1 to schedule an appointment. If you have any questions or concerns, please don't hesitate to call.     Sincerely,      Veronica Burns, [address]

## 2020-08-31 ENCOUNTER — TELEPHONE (OUTPATIENT)
Dept: FAMILY MEDICINE CLINIC | Age: 75
End: 2020-08-31

## 2020-09-09 ENCOUNTER — TELEPHONE (OUTPATIENT)
Dept: FAMILY MEDICINE CLINIC | Age: 75
End: 2020-09-09

## 2020-09-09 NOTE — TELEPHONE ENCOUNTER
Rinku Harley at [facility] called and her mammogram order needs changed. The current diagnosis will not be covered by Medicare.  Must be changed to Z12.31.

## 2020-09-10 ENCOUNTER — HOSPITAL ENCOUNTER (OUTPATIENT)
Dept: WOMENS IMAGING | Age: 75
Discharge: HOME | End: 2020-09-10
Payer: MEDICARE

## 2020-09-10 PROCEDURE — 77063 BREAST TOMOSYNTHESIS BI: CPT

## 2020-09-10 PROCEDURE — 77080 DXA BONE DENSITY AXIAL: CPT

## 2020-09-14 ENCOUNTER — OFFICE VISIT (OUTPATIENT)
Dept: FAMILY MEDICINE CLINIC | Age: 75
End: 2020-09-14
Payer: MEDICARE

## 2020-09-14 VITALS
SYSTOLIC BLOOD PRESSURE: 120 MMHG | DIASTOLIC BLOOD PRESSURE: 70 MMHG | WEIGHT: 165.4 LBS | OXYGEN SATURATION: 98 % | TEMPERATURE: 97.2 F | RESPIRATION RATE: 18 BRPM | HEART RATE: 84 BPM | BODY MASS INDEX: 28.39 KG/M2

## 2020-09-14 PROBLEM — F33.42 MAJOR DEPRESSIVE DISORDER, RECURRENT, IN FULL REMISSION (HCC): Status: ACTIVE | Noted: 2020-09-14

## 2020-09-14 LAB — HBA1C MFR BLD: 14 %

## 2020-09-14 PROCEDURE — 1036F TOBACCO NON-USER: CPT | Performed by: FAMILY MEDICINE

## 2020-09-14 PROCEDURE — 83036 HEMOGLOBIN GLYCOSYLATED A1C: CPT | Performed by: FAMILY MEDICINE

## 2020-09-14 PROCEDURE — 3046F HEMOGLOBIN A1C LEVEL >9.0%: CPT | Performed by: FAMILY MEDICINE

## 2020-09-14 PROCEDURE — 2022F DILAT RTA XM EVC RTNOPTHY: CPT | Performed by: FAMILY MEDICINE

## 2020-09-14 PROCEDURE — G8399 PT W/DXA RESULTS DOCUMENT: HCPCS | Performed by: FAMILY MEDICINE

## 2020-09-14 PROCEDURE — 4040F PNEUMOC VAC/ADMIN/RCVD: CPT | Performed by: FAMILY MEDICINE

## 2020-09-14 PROCEDURE — 99214 OFFICE O/P EST MOD 30 MIN: CPT | Performed by: FAMILY MEDICINE

## 2020-09-14 PROCEDURE — G8427 DOCREV CUR MEDS BY ELIG CLIN: HCPCS | Performed by: FAMILY MEDICINE

## 2020-09-14 PROCEDURE — 3017F COLORECTAL CA SCREEN DOC REV: CPT | Performed by: FAMILY MEDICINE

## 2020-09-14 PROCEDURE — 1090F PRES/ABSN URINE INCON ASSESS: CPT | Performed by: FAMILY MEDICINE

## 2020-09-14 PROCEDURE — 1123F ACP DISCUSS/DSCN MKR DOCD: CPT | Performed by: FAMILY MEDICINE

## 2020-09-14 PROCEDURE — G8417 CALC BMI ABV UP PARAM F/U: HCPCS | Performed by: FAMILY MEDICINE

## 2020-09-14 RX ORDER — METFORMIN HYDROCHLORIDE 500 MG/1
1000 TABLET, EXTENDED RELEASE ORAL
Qty: 60 TABLET | Refills: 5 | Status: SHIPPED | OUTPATIENT
Start: 2020-09-14 | End: 2020-12-21 | Stop reason: SDUPTHER

## 2020-09-14 RX ORDER — BLOOD SUGAR DIAGNOSTIC
STRIP MISCELLANEOUS
Qty: 100 EACH | Refills: 3 | Status: SHIPPED | OUTPATIENT
Start: 2020-09-14 | End: 2021-10-03

## 2020-09-14 RX ORDER — LANCETS 30 GAUGE
1 EACH MISCELLANEOUS DAILY
Qty: 100 EACH | Refills: 5 | Status: SHIPPED | OUTPATIENT
Start: 2020-09-14 | End: 2022-08-19 | Stop reason: SDUPTHER

## 2020-09-15 ASSESSMENT — ENCOUNTER SYMPTOMS
BACK PAIN: 0
VOMITING: 0
CHEST TIGHTNESS: 0
NAUSEA: 0
SHORTNESS OF BREATH: 0
EYES NEGATIVE: 1
SORE THROAT: 0
RHINORRHEA: 0
DIARRHEA: 0
ABDOMINAL PAIN: 0
COUGH: 0

## 2020-09-15 NOTE — PROGRESS NOTES
[address] [place] [place] [place] [id number]  Dept: [phone]  Dept Fax: [phone]  Loc: [phone]  PROGRESS NOTE      VisitDate: 9/14/2020    Antonio Houston is a 69 y.o. female who presents today for:     Chief Complaint   Patient presents with    Discuss Labs     labs and mammo/dexa in epic         Subjective:  HPI  Patient with a history of major depressive disorder hypertension hyperlipidemia comes in for follow-up. Her blood pressures been stable and well-controlled. Depressive symptoms also stable and controlled at this time. History of hyperlipidemia, not at goal but stable. In reviewing lab work we discussed her significantly elevated blood glucose level. Hemoglobin A1c was done in the office and was 14. She has a strong family history of adult onset diabetes    Review of Systems   Constitutional: Negative for activity change, appetite change, fatigue and fever. HENT: Negative for congestion, rhinorrhea and sore throat. Eyes: Negative. Respiratory: Negative for cough, chest tightness and shortness of breath. Cardiovascular: Negative for chest pain and palpitations. Gastrointestinal: Negative for abdominal pain, diarrhea, nausea and vomiting. Genitourinary: Negative for dysuria and urgency. Musculoskeletal: Negative for arthralgias and back pain. Neurological: Negative for dizziness and headaches. Psychiatric/Behavioral: Negative for dysphoric mood. The patient is not nervous/anxious.       Past Medical History:   Diagnosis Date    Depression     Hypertension       Past Surgical History:   Procedure Laterality Date    SKIN CANCER EXCISION N/A      Family History   Problem Relation Age of Onset    Cancer Sister         breast    High Cholesterol Mother     Heart Disease Father     Cancer Father         Bladder Cancer    Stroke Father     Cancer Sister         Breast Cancer    Cancer Sister Breast Cancer    Cancer Maternal Grandmother         Breast Cancer    Cancer Paternal Grandmother         Breast Cancer     Social History     Tobacco Use    Smoking status: Never Smoker    Smokeless tobacco: Never Used   Substance Use Topics    Alcohol use: Yes     Comment: occasional      Current Outpatient Medications   Medication Sig Dispense Refill    metFORMIN (GLUCOPHAGE-XR) 500 MG extended release tablet Take 2 tablets by mouth daily (with breakfast) 60 tablet 5    blood glucose test strips (GLUCOSE METER TEST) strip Test 1x daily 100 each 3    Lancets MISC 1 each by Does not apply route daily 100 each 5    busPIRone (BUSPAR) 15 MG tablet Take 15 mg by mouth daily 90 tablet 3    losartan (COZAAR) 50 MG tablet TAKE ONE TABLET BY MOUTH ONCE DAILY 90 tablet 3    pantoprazole (PROTONIX) 40 MG tablet Take 1 tablet by mouth daily 90 tablet 3    amLODIPine (NORVASC) 5 MG tablet TAKE ONE TABLET BY MOUTH ONCE DAILY 90 tablet 3    DULoxetine (CYMBALTA) 60 MG extended release capsule TAKE one CAPSULE BY MOUTH ONCE DAILY 90 capsule 3    atorvastatin (LIPITOR) 20 MG tablet TAKE ONE TABLET BY MOUTH ONCE DAILY 90 tablet 3     No current facility-administered medications for this visit.       Allergies   Allergen Reactions    Lisinopril Other (See Comments)     Dry mouth    Sulfa Antibiotics Hives and Other (See Comments)     hypotension     Health Maintenance   Topic Date Due    Diabetic foot exam  10[birth year]05/1955    Diabetic retinal exam  10/05/1957    Diabetic microalbuminuria test  10/05/1965    Shingles Vaccine (2 of 3) 12/20/2014    Annual Wellness Visit (AWV)  05/29/2019    A1C test (Diabetic or Prediabetic)  12/14/2020    Lipid screen  08/20/2021    Potassium monitoring  08/20/2021    Creatinine monitoring  08/20/2021    Breast cancer screen  09/10/2022    DTaP/Tdap/Td vaccine (2 - Td) 01/08/2026    Colon cancer screen colonoscopy  06/08/2027    DEXA (modify frequency per FRAX score) Completed    Flu vaccine  Completed    Pneumococcal 65+ years Vaccine  Completed    Hepatitis C screen  Completed    Hepatitis A vaccine  Aged Out    Hepatitis B vaccine  Aged Out    Hib vaccine  Aged Out    Meningococcal (ACWY) vaccine  Aged Out         Objective:     Physical Exam  Constitutional:       General: She is not in acute distress. Appearance: She is well-developed. She is not diaphoretic. HENT:      Head: Normocephalic and atraumatic. Eyes:      General: No scleral icterus. Conjunctiva/sclera: Conjunctivae normal.   Neck:      Thyroid: No thyromegaly. Vascular: No JVD. Comments: No bruits  Cardiovascular:      Rate and Rhythm: Normal rate and regular rhythm. Heart sounds: Normal heart sounds. Pulmonary:      Effort: Pulmonary effort is normal. No respiratory distress. Breath sounds: Normal breath sounds. No wheezing or rales. Abdominal:      Palpations: Abdomen is soft. There is no mass. Tenderness: There is no abdominal tenderness. There is no guarding. Musculoskeletal:         General: No tenderness. Skin:     General: Skin is warm and dry. Findings: No rash. Neurological:      Mental Status: She is alert and oriented to person, place, and time. Cranial Nerves: No cranial nerve deficit. /70   Pulse 84   Temp 97.2 °F (36.2 °C) (Temporal)   Resp 18   Wt 165 lb 6.4 oz (75 kg)   SpO2 98%   BMI 28.39 kg/m²       Impression/Plan:  1. Type 2 diabetes mellitus with hyperglycemia, without long-term current use of insulin ([facility] [address]    2. Major depressive disorder, recurrent, in full remission ([facility] [address]    3. Essential hypertension    4.  Hyperlipidemia, unspecified hyperlipidemia type      Requested Prescriptions     Signed Prescriptions Disp Refills    metFORMIN (GLUCOPHAGE-XR) 500 MG extended release tablet 60 tablet 5     Sig: Take 2 tablets by mouth daily (with breakfast)    blood glucose test strips (GLUCOSE METER TEST) strip 100 each 3     Sig: Test 1x daily    Lancets MISC 100 each 5     Si each by Does not apply route daily     Orders Placed This Encounter   Procedures    POCT glycosylated hemoglobin (Hb A1C)    DME Order for Glucometer as OP     You must complete the order parameters below and add the medical necessity documentation for this DME in a separate note. Home blood glucose monitor    Diagnosis: dm2    Testing frequency: Check blood sugars and record 1 times daily    Duration: purchase   Patient was instructed on use of glucometer. Detailed discussion regarding diet and lifestyle changes necessary for improved glucose control. Reviewed metformin side effects and dosing. She will record her blood glucose levels and follow-up in 2 to 3 weeks. We discussed the diabetic center and she states she like to hold off and we can discuss at her follow-up appointment    Patient giveneducational materials - see patient instructions. Discussed use, benefit, and side effects of prescribed medications. All patient questions answered. Pt voiced understanding. Reviewed health maintenance. Patient agreedwith treatment plan. Follow up as directed. **This report has been created using voice recognition software. It may contain minor errorswhich are inherent in voice recognition technology. **       Electronically signed by Marta Rubio MD on 9/15/2020 at 8:44 AM

## 2020-09-28 ENCOUNTER — OFFICE VISIT (OUTPATIENT)
Dept: FAMILY MEDICINE CLINIC | Age: 75
End: 2020-09-28
Payer: MEDICARE

## 2020-09-28 VITALS
BODY MASS INDEX: 29.11 KG/M2 | HEART RATE: 90 BPM | DIASTOLIC BLOOD PRESSURE: 70 MMHG | TEMPERATURE: 97 F | SYSTOLIC BLOOD PRESSURE: 110 MMHG | OXYGEN SATURATION: 97 % | RESPIRATION RATE: 16 BRPM | WEIGHT: 169.6 LBS

## 2020-09-28 PROCEDURE — 4040F PNEUMOC VAC/ADMIN/RCVD: CPT | Performed by: FAMILY MEDICINE

## 2020-09-28 PROCEDURE — G8427 DOCREV CUR MEDS BY ELIG CLIN: HCPCS | Performed by: FAMILY MEDICINE

## 2020-09-28 PROCEDURE — G8417 CALC BMI ABV UP PARAM F/U: HCPCS | Performed by: FAMILY MEDICINE

## 2020-09-28 PROCEDURE — 99213 OFFICE O/P EST LOW 20 MIN: CPT | Performed by: FAMILY MEDICINE

## 2020-09-28 PROCEDURE — 3017F COLORECTAL CA SCREEN DOC REV: CPT | Performed by: FAMILY MEDICINE

## 2020-09-28 PROCEDURE — 1123F ACP DISCUSS/DSCN MKR DOCD: CPT | Performed by: FAMILY MEDICINE

## 2020-09-28 PROCEDURE — 1036F TOBACCO NON-USER: CPT | Performed by: FAMILY MEDICINE

## 2020-09-28 PROCEDURE — 3046F HEMOGLOBIN A1C LEVEL >9.0%: CPT | Performed by: FAMILY MEDICINE

## 2020-09-28 PROCEDURE — 2022F DILAT RTA XM EVC RTNOPTHY: CPT | Performed by: FAMILY MEDICINE

## 2020-09-28 PROCEDURE — G8399 PT W/DXA RESULTS DOCUMENT: HCPCS | Performed by: FAMILY MEDICINE

## 2020-09-28 PROCEDURE — 1090F PRES/ABSN URINE INCON ASSESS: CPT | Performed by: FAMILY MEDICINE

## 2020-09-28 RX ORDER — EMPAGLIFLOZIN 25 MG/1
25 TABLET, FILM COATED ORAL DAILY
Qty: 30 TABLET | Refills: 3 | Status: SHIPPED | OUTPATIENT
Start: 2020-09-28 | End: 2020-12-21 | Stop reason: SDUPTHER

## 2020-09-28 ASSESSMENT — ENCOUNTER SYMPTOMS
SHORTNESS OF BREATH: 0
VOMITING: 0
CHEST TIGHTNESS: 0
DIARRHEA: 0
COUGH: 0
NAUSEA: 0
BACK PAIN: 0
EYES NEGATIVE: 1
SORE THROAT: 0
ABDOMINAL PAIN: 0
RHINORRHEA: 0

## 2020-09-29 NOTE — PROGRESS NOTES
[address] [place] [address]  Dept: [phone]  Dept Fax: [phone]  Loc: [phone]  PROGRESS NOTE      VisitDate: 9/28/2020    Rhianna Galvan is a 69 y.o. female who presents today for:     Chief Complaint   Patient presents with    Follow-up     2 week follow up DM         Subjective:  HPI  Follow-up for diabetes. She was seen 2 weeks ago get her restarted on her medications but she is not been able to check her own blood sugars. She states she has a fear and anxiety about poking her finger. She tried to improve her diet she is taking medications without any side effects    Review of Systems   Constitutional: Negative for activity change, appetite change, fatigue and fever. HENT: Negative for congestion, rhinorrhea and sore throat. Eyes: Negative. Respiratory: Negative for cough, chest tightness and shortness of breath. Cardiovascular: Negative for chest pain and palpitations. Gastrointestinal: Negative for abdominal pain, diarrhea, nausea and vomiting. Genitourinary: Negative for dysuria and urgency. Musculoskeletal: Negative for arthralgias and back pain. Neurological: Negative for dizziness and headaches. Psychiatric/Behavioral: Negative for dysphoric mood. The patient is not nervous/anxious.       Past Medical History:   Diagnosis Date    Depression     Hypertension       Past Surgical History:   Procedure Laterality Date    SKIN CANCER EXCISION N/A      Family History   Problem Relation Age of Onset   [facility] Cancer Sister         breast    High Cholesterol Mother     Heart Disease Father     Cancer Father         Bladder Cancer    Stroke Father     Cancer Sister         Breast Cancer    Cancer Sister         Breast Cancer    Cancer Maternal Grandmother         Breast Cancer    Cancer Paternal Grandmother         Breast Cancer     Social History     Tobacco Use    Smoking status: Never Smoker    Smokeless tobacco: Never Used   Substance Use Topics    Alcohol use: Yes     Comment: occasional      Current Outpatient Medications   Medication Sig Dispense Refill    empagliflozin (JARDIANCE) 25 MG tablet Take 25 mg by mouth daily 30 tablet 3    metFORMIN (GLUCOPHAGE-XR) 500 MG extended release tablet Take 2 tablets by mouth daily (with breakfast) 60 tablet 5    blood glucose test strips (GLUCOSE METER TEST) strip Test 1x daily 100 each 3    Lancets MISC 1 each by Does not apply route daily 100 each 5    busPIRone (BUSPAR) 15 MG tablet Take 15 mg by mouth daily 90 tablet 3    losartan (COZAAR) 50 MG tablet TAKE ONE TABLET BY MOUTH ONCE DAILY 90 tablet 3    pantoprazole (PROTONIX) 40 MG tablet Take 1 tablet by mouth daily 90 tablet 3    amLODIPine (NORVASC) 5 MG tablet TAKE ONE TABLET BY MOUTH ONCE DAILY 90 tablet 3    DULoxetine (CYMBALTA) 60 MG extended release capsule TAKE one CAPSULE BY MOUTH ONCE DAILY 90 capsule 3    atorvastatin (LIPITOR) 20 MG tablet TAKE ONE TABLET BY MOUTH ONCE DAILY 90 tablet 3     No current facility-administered medications for this visit.       Allergies   Allergen Reactions    Lisinopril Other (See Comments)     Dry mouth    Sulfa Antibiotics Hives and Other (See Comments)     hypotension     Health Maintenance   Topic Date Due    Diabetic foot exam  10/05/1957    Diabetic retinal exam  10/05/1957    Diabetic microalbuminuria test  10/05/1965    Shingles Vaccine (2 of 3) 12/20/2014    Annual Wellness Visit (AWV)  05/29/2019    A1C test (Diabetic or Prediabetic)  12/14/2020    Statin Therapy  07/08/2021    Lipid screen  08/20/2021    Potassium monitoring  08/20/2021    Creatinine monitoring  08/20/2021    Breast cancer screen  09/10/2022    DTaP/Tdap/Td vaccine (2 - Td) 01/08/2026    Colon cancer screen colonoscopy  06/08/2027    DEXA (modify frequency per FRAX score)  Completed    Flu vaccine  Completed    Pneumococcal 65+ years Vaccine Completed    Hepatitis C screen  Completed    Hepatitis A vaccine  Aged Out    Hepatitis B vaccine  Aged Out    Hib vaccine  Aged Out    Meningococcal (ACWY) vaccine  Aged Out         Objective:     Physical Exam  Constitutional:       General: She is not in acute distress. Appearance: She is well-developed. She is not diaphoretic. HENT:      Head: Normocephalic and atraumatic. Eyes:      General: No scleral icterus. Conjunctiva/sclera: Conjunctivae normal.   Neck:      Thyroid: No thyromegaly. Vascular: No JVD. Comments: No bruits  Cardiovascular:      Rate and Rhythm: Normal rate and regular rhythm. Heart sounds: Normal heart sounds. Pulmonary:      Effort: Pulmonary effort is normal. No respiratory distress. Breath sounds: Normal breath sounds. No wheezing or rales. Abdominal:      Palpations: Abdomen is soft. There is no mass. Tenderness: There is no abdominal tenderness. There is no guarding. Musculoskeletal:         General: No tenderness. Skin:     General: Skin is warm and dry. Findings: No rash. Neurological:      Mental Status: She is alert and oriented to person, place, and time. Cranial Nerves: No cranial nerve deficit. /70   Pulse 90   Temp 97 °F (36.1 °C) (Temporal)   Resp 16   Wt 169 lb 9.6 oz (76.9 kg)   SpO2 97%   BMI 29.11 kg/m²       Impression/Plan:  1.  Type 2 diabetes mellitus with hyperglycemia, without long-term current use of insulin (HCC)      Requested Prescriptions     Signed Prescriptions Disp Refills    empagliflozin (JARDIANCE) 25 MG tablet 30 tablet 3     Sig: Take 25 mg by mouth daily     Orders Placed This Encounter   Procedures    Hemoglobin A1C     Standing Status:   Future     Standing Expiration Date:   9/28/2021    Comprehensive Metabolic Panel     Standing Status:   Future     Standing Expiration Date:   9/28/2021    Lipid Panel     Standing Status:   Future     Standing Expiration Date: 9/28/2021     Order Specific Question:   Is Patient Fasting?/# of Hours     Answer:   yes/12    Microalbumin / Creatinine Urine Ratio     Standing Status:   Future     Standing Expiration Date:   9/28/2021   We discussed that the wearable monitors are likely not to be covered. We discussed potential referral to diabetic clinic for education patient declined encouraged her to check her blood sugars we discussed how you can change the setting on the lancing device, orders as above. Patient giveneducational materials - see patient instructions. Discussed use, benefit, and side effects of prescribed medications. All patient questions answered. Pt voiced understanding. Reviewed health maintenance. Patient agreedwith treatment plan. Follow up as directed. **This report has been created using voice recognition software. It may contain minor errorswhich are inherent in voice recognition technology. **       Electronically signed by Jama Sierra MD on 9/28/2020 at 10:34 PM

## 2020-11-13 ENCOUNTER — TELEPHONE (OUTPATIENT)
Dept: FAMILY MEDICINE CLINIC | Age: 75
End: 2020-11-13

## 2020-11-13 NOTE — TELEPHONE ENCOUNTER
Patient requesting referral to diabetes clinic. Says you discussed it at last visit.  If ok, please sign pended referral.

## 2020-11-20 ENCOUNTER — NURSE ONLY (OUTPATIENT)
Dept: LAB | Age: 75
End: 2020-11-20

## 2020-11-20 ENCOUNTER — OFFICE VISIT (OUTPATIENT)
Dept: FAMILY MEDICINE CLINIC | Age: 75
End: 2020-11-20
Payer: MEDICARE

## 2020-11-20 ENCOUNTER — HOSPITAL ENCOUNTER (OUTPATIENT)
Dept: GENERAL RADIOLOGY | Age: 75
Discharge: HOME | End: 2020-11-20
Payer: MEDICARE

## 2020-11-20 ENCOUNTER — HOSPITAL ENCOUNTER (OUTPATIENT)
Age: 75
Discharge: HOME | End: 2020-11-20
Payer: MEDICARE

## 2020-11-20 VITALS
HEART RATE: 84 BPM | TEMPERATURE: 97.2 F | WEIGHT: 164.4 LBS | BODY MASS INDEX: 28.22 KG/M2 | RESPIRATION RATE: 16 BRPM | DIASTOLIC BLOOD PRESSURE: 78 MMHG | SYSTOLIC BLOOD PRESSURE: 122 MMHG | OXYGEN SATURATION: 98 %

## 2020-11-20 LAB
ALBUMIN SERPL-MCNC: 4.3 G/DL (ref 3.5–5.1)
ALP BLD-CCNC: 86 U/L (ref 38–126)
ALT SERPL-CCNC: 13 U/L (ref 11–66)
ANION GAP SERPL CALCULATED.3IONS-SCNC: 10 MEQ/L (ref 8–16)
AST SERPL-CCNC: 14 U/L (ref 5–40)
AVERAGE GLUCOSE: 234 MG/DL (ref 70–126)
BILIRUB SERPL-MCNC: 0.8 MG/DL (ref 0.3–1.2)
BUN BLDV-MCNC: 21 MG/DL (ref 7–22)
CALCIUM SERPL-MCNC: 9.6 MG/DL (ref 8.5–10.5)
CHLORIDE BLD-SCNC: 102 MEQ/L (ref 98–111)
CHOLESTEROL, TOTAL: 146 MG/DL (ref 100–199)
CO2: 27 MEQ/L (ref 23–33)
CREAT SERPL-MCNC: 0.9 MG/DL (ref 0.4–1.2)
CREATININE, URINE: 91.5 MG/DL
GFR SERPL CREATININE-BSD FRML MDRD: 61 ML/MIN/1.73M2
GLUCOSE BLD-MCNC: 118 MG/DL (ref 70–108)
HBA1C MFR BLD: 9.8 % (ref 4.4–6.4)
HDLC SERPL-MCNC: 49 MG/DL
LDL CHOLESTEROL CALCULATED: 74 MG/DL
MICROALBUMIN UR-MCNC: 2.88 MG/DL
MICROALBUMIN/CREAT UR-RTO: 31 MG/G (ref 0–30)
POTASSIUM SERPL-SCNC: 4 MEQ/L (ref 3.5–5.2)
SODIUM BLD-SCNC: 139 MEQ/L (ref 135–145)
TOTAL PROTEIN: 7.2 G/DL (ref 6.1–8)
TRIGL SERPL-MCNC: 115 MG/DL (ref 0–199)

## 2020-11-20 PROCEDURE — 4040F PNEUMOC VAC/ADMIN/RCVD: CPT | Performed by: FAMILY MEDICINE

## 2020-11-20 PROCEDURE — 1123F ACP DISCUSS/DSCN MKR DOCD: CPT | Performed by: FAMILY MEDICINE

## 2020-11-20 PROCEDURE — 1090F PRES/ABSN URINE INCON ASSESS: CPT | Performed by: FAMILY MEDICINE

## 2020-11-20 PROCEDURE — G8427 DOCREV CUR MEDS BY ELIG CLIN: HCPCS | Performed by: FAMILY MEDICINE

## 2020-11-20 PROCEDURE — 96372 THER/PROPH/DIAG INJ SC/IM: CPT | Performed by: FAMILY MEDICINE

## 2020-11-20 PROCEDURE — G8399 PT W/DXA RESULTS DOCUMENT: HCPCS | Performed by: FAMILY MEDICINE

## 2020-11-20 PROCEDURE — 73030 X-RAY EXAM OF SHOULDER: CPT

## 2020-11-20 PROCEDURE — G8417 CALC BMI ABV UP PARAM F/U: HCPCS | Performed by: FAMILY MEDICINE

## 2020-11-20 PROCEDURE — 99213 OFFICE O/P EST LOW 20 MIN: CPT | Performed by: FAMILY MEDICINE

## 2020-11-20 PROCEDURE — 1036F TOBACCO NON-USER: CPT | Performed by: FAMILY MEDICINE

## 2020-11-20 PROCEDURE — 3017F COLORECTAL CA SCREEN DOC REV: CPT | Performed by: FAMILY MEDICINE

## 2020-11-20 PROCEDURE — G8484 FLU IMMUNIZE NO ADMIN: HCPCS | Performed by: FAMILY MEDICINE

## 2020-11-20 RX ORDER — METHYLPREDNISOLONE ACETATE 80 MG/ML
160 INJECTION, SUSPENSION INTRA-ARTICULAR; INTRALESIONAL; INTRAMUSCULAR; SOFT TISSUE ONCE
Status: COMPLETED | OUTPATIENT
Start: 2020-11-20 | End: 2020-11-20

## 2020-11-20 RX ADMIN — METHYLPREDNISOLONE ACETATE 160 MG: 80 INJECTION, SUSPENSION INTRA-ARTICULAR; INTRALESIONAL; INTRAMUSCULAR; SOFT TISSUE at 11:03

## 2020-11-20 NOTE — PROGRESS NOTES
Administrations This Visit     methylPREDNISolone acetate (DEPO-MEDROL) injection 160 mg     Admin Date  11/20/2020  11:03 Action  Given Dose  160 mg Route  Intramuscular Site  Deltoid Right Administered By  Joel Shay RN    Ordering Provider:  Reshma Rangel MD    NDC:  0167-1238-76    Lot#:  [id number]    :  [employer]    Patient Supplied?:  No    Comments:  exp 6/2021

## 2020-11-24 ENCOUNTER — TELEPHONE (OUTPATIENT)
Dept: FAMILY MEDICINE CLINIC | Age: 75
End: 2020-11-24

## 2020-11-24 NOTE — TELEPHONE ENCOUNTER
----- Message from Doni Griffin MD sent at 11/24/2020  2:00 PM EST -----  X-ray shows no evidence of fracture or dislocation.   If symptoms continue recommend MRI of the shoulder

## 2020-12-01 ASSESSMENT — ENCOUNTER SYMPTOMS
CHEST TIGHTNESS: 0
RHINORRHEA: 0
EYES NEGATIVE: 1
DIARRHEA: 0
VOMITING: 0
SHORTNESS OF BREATH: 0
SORE THROAT: 0
ABDOMINAL PAIN: 0
NAUSEA: 0
COUGH: 0
BACK PAIN: 0

## 2020-12-01 NOTE — PROGRESS NOTES
[address] [place] [facility] [address]  Dept: [phone]  Dept Fax: [phone]  Loc: [phone]  PROGRESS NOTE      VisitDate: 11/20/2020    Vonnie Alvarez is a 70 y.o. female who presents today for:     Chief Complaint   Patient presents with    Shoulder Pain     right shoulder pain, fell in the summer and think something is wrong with it. Subjective:  HPI  Patient comes in with right-sided shoulder pain has been present since the summer following a fall. Status given her problems with decreased range of motion    Review of Systems   Constitutional: Negative for activity change, appetite change, fatigue and fever. HENT: Negative for congestion, rhinorrhea and sore throat. Eyes: Negative. Respiratory: Negative for cough, chest tightness and shortness of breath. Cardiovascular: Negative for chest pain and palpitations. Gastrointestinal: Negative for abdominal pain, diarrhea, nausea and vomiting. Genitourinary: Negative for dysuria and urgency. Musculoskeletal: Positive for arthralgias. Negative for back pain. Neurological: Negative for dizziness and headaches. Psychiatric/Behavioral: Negative for dysphoric mood. The patient is not nervous/anxious.       Past Medical History:   Diagnosis Date    Depression     Hypertension       Past Surgical History:   Procedure Laterality Date    SKIN CANCER EXCISION N/A      Family History   Problem Relation Age of Onset   [place] [facility] Cancer Sister         breast    High Cholesterol Mother     Heart Disease Father     Cancer Father         Bladder Cancer    Stroke Father     Cancer Sister         Breast Cancer    Cancer Sister         Breast Cancer    Cancer Maternal Grandmother         Breast Cancer    Cancer Paternal Grandmother         Breast Cancer     Social History     Tobacco Use    Smoking status: Never Smoker    Smokeless tobacco: Never Used   Substance Use Topics    Alcohol use: Yes     Comment: occasional      Current Outpatient Medications   Medication Sig Dispense Refill    empagliflozin (JARDIANCE) 25 MG tablet Take 25 mg by mouth daily 30 tablet 3    metFORMIN (GLUCOPHAGE-XR) 500 MG extended release tablet Take 2 tablets by mouth daily (with breakfast) 60 tablet 5    blood glucose test strips (GLUCOSE METER TEST) strip Test 1x daily 100 each 3    Lancets MISC 1 each by Does not apply route daily 100 each 5    busPIRone (BUSPAR) 15 MG tablet Take 15 mg by mouth daily 90 tablet 3    losartan (COZAAR) 50 MG tablet TAKE ONE TABLET BY MOUTH ONCE DAILY 90 tablet 3    pantoprazole (PROTONIX) 40 MG tablet Take 1 tablet by mouth daily 90 tablet 3    amLODIPine (NORVASC) 5 MG tablet TAKE ONE TABLET BY MOUTH ONCE DAILY 90 tablet 3    DULoxetine (CYMBALTA) 60 MG extended release capsule TAKE one CAPSULE BY MOUTH ONCE DAILY 90 capsule 3    atorvastatin (LIPITOR) 20 MG tablet TAKE ONE TABLET BY MOUTH ONCE DAILY 90 tablet 3     No current facility-administered medications for this visit.       Allergies   Allergen Reactions    Lisinopril Other (See Comments)     Dry mouth    Sulfa Antibiotics Hives and Other (See Comments)     hypotension     Health Maintenance   Topic Date Due    Diabetic foot exam  10/05/1957    Diabetic retinal exam  10/05/1957    Shingles Vaccine (2 of 3) 12/20/2014    Annual Wellness Visit (AWV)  05/29/2019    A1C test (Diabetic or Prediabetic)  02/20/2021    Diabetic microalbuminuria test  11/20/2021    Lipid screen  11/20/2021    Potassium monitoring  11/20/2021    Creatinine monitoring  11/20/2021    Breast cancer screen  09/10/2022    DTaP/Tdap/Td vaccine (2 - Td) 01/08/2026    Colon cancer screen colonoscopy  06/08/2027    DEXA (modify frequency per FRAX score)  Completed    Flu vaccine  Completed    Pneumococcal 65+ years Vaccine  Completed    Hepatitis C screen  Completed    Hepatitis A vaccine  Aged Out    Hib vaccine Aged Out    Meningococcal (ACWY) vaccine  Aged Out         Objective:     Physical Exam  Constitutional:       General: She is not in acute distress. Appearance: She is well-developed. She is not diaphoretic. HENT:      Head: Normocephalic and atraumatic. Eyes:      General: No scleral icterus. Conjunctiva/sclera: Conjunctivae normal.   Neck:      Thyroid: No thyromegaly. Vascular: No JVD. Comments: No bruits  Cardiovascular:      Rate and Rhythm: Normal rate and regular rhythm. Heart sounds: Normal heart sounds. Pulmonary:      Effort: Pulmonary effort is normal. No respiratory distress. Breath sounds: Normal breath sounds. No wheezing or rales. Abdominal:      Palpations: Abdomen is soft. There is no mass. Tenderness: There is no abdominal tenderness. There is no guarding. Musculoskeletal:         General: No tenderness. Comments: Decreased range of motion and strength in the right shoulder with tenderness in the subacromial area   Skin:     General: Skin is warm and dry. Findings: No rash. Neurological:      Mental Status: She is alert and oriented to person, place, and time. Cranial Nerves: No cranial nerve deficit. /78   Pulse 84   Temp 97.2 °F (36.2 °C) (Temporal)   Resp 16   Wt 164 lb 6.4 oz (74.6 kg)   SpO2 98%   BMI 28.22 kg/m²       Impression/Plan:  1. Acute pain of right shoulder    2. Fall, initial encounter      Requested Prescriptions      No prescriptions requested or ordered in this encounter     Orders Placed This Encounter   Procedures    XR SHOULDER RIGHT (MIN 2 VIEWS)     Standing Status:   Future     Number of Occurrences:   1     Standing Expiration Date:   11/20/2021     Methylprednisolone 160 IM patient giveneducational materials - see patient instructions. Discussed use, benefit, and side effects of prescribed medications. All patient questions answered. Pt voiced understanding. Reviewed health maintenance.

## 2020-12-21 ENCOUNTER — OFFICE VISIT (OUTPATIENT)
Dept: FAMILY MEDICINE CLINIC | Age: 75
End: 2020-12-21
Payer: MEDICARE

## 2020-12-21 VITALS
WEIGHT: 163.4 LBS | HEIGHT: 64 IN | BODY MASS INDEX: 27.9 KG/M2 | SYSTOLIC BLOOD PRESSURE: 106 MMHG | HEART RATE: 80 BPM | TEMPERATURE: 97 F | DIASTOLIC BLOOD PRESSURE: 72 MMHG | RESPIRATION RATE: 16 BRPM | OXYGEN SATURATION: 98 %

## 2020-12-21 PROCEDURE — 3046F HEMOGLOBIN A1C LEVEL >9.0%: CPT | Performed by: FAMILY MEDICINE

## 2020-12-21 PROCEDURE — 4040F PNEUMOC VAC/ADMIN/RCVD: CPT | Performed by: FAMILY MEDICINE

## 2020-12-21 PROCEDURE — G8484 FLU IMMUNIZE NO ADMIN: HCPCS | Performed by: FAMILY MEDICINE

## 2020-12-21 PROCEDURE — 1123F ACP DISCUSS/DSCN MKR DOCD: CPT | Performed by: FAMILY MEDICINE

## 2020-12-21 PROCEDURE — 2022F DILAT RTA XM EVC RTNOPTHY: CPT | Performed by: FAMILY MEDICINE

## 2020-12-21 PROCEDURE — G8427 DOCREV CUR MEDS BY ELIG CLIN: HCPCS | Performed by: FAMILY MEDICINE

## 2020-12-21 PROCEDURE — 1090F PRES/ABSN URINE INCON ASSESS: CPT | Performed by: FAMILY MEDICINE

## 2020-12-21 PROCEDURE — 99213 OFFICE O/P EST LOW 20 MIN: CPT | Performed by: FAMILY MEDICINE

## 2020-12-21 PROCEDURE — 3017F COLORECTAL CA SCREEN DOC REV: CPT | Performed by: FAMILY MEDICINE

## 2020-12-21 PROCEDURE — G8399 PT W/DXA RESULTS DOCUMENT: HCPCS | Performed by: FAMILY MEDICINE

## 2020-12-21 PROCEDURE — 1036F TOBACCO NON-USER: CPT | Performed by: FAMILY MEDICINE

## 2020-12-21 PROCEDURE — G8417 CALC BMI ABV UP PARAM F/U: HCPCS | Performed by: FAMILY MEDICINE

## 2020-12-21 RX ORDER — EMPAGLIFLOZIN 25 MG/1
25 TABLET, FILM COATED ORAL DAILY
Qty: 90 TABLET | Refills: 3 | Status: SHIPPED | OUTPATIENT
Start: 2020-12-21 | End: 2021-11-18 | Stop reason: SDUPTHER

## 2020-12-21 RX ORDER — METFORMIN HYDROCHLORIDE 500 MG/1
1000 TABLET, EXTENDED RELEASE ORAL
Qty: 180 TABLET | Refills: 3 | Status: SHIPPED | OUTPATIENT
Start: 2020-12-21 | End: 2021-11-18 | Stop reason: SDUPTHER

## 2020-12-27 ASSESSMENT — ENCOUNTER SYMPTOMS
SHORTNESS OF BREATH: 0
DIARRHEA: 0
RHINORRHEA: 0
SORE THROAT: 0
NAUSEA: 0
CHEST TIGHTNESS: 0
VOMITING: 0
COUGH: 0
BACK PAIN: 0
EYES NEGATIVE: 1
ABDOMINAL PAIN: 0

## 2020-12-27 NOTE — PROGRESS NOTES
[address] [place] [facility] [address]  Dept: [phone]  Dept Fax: [phone]  Loc: [phone]  PROGRESS NOTE      VisitDate: 12/21/2020    Vonnie Alvarez is a 70 y.o. female who presents today for:     Chief Complaint   Patient presents with    Follow-up     Pt presents for a 3 month f/u. Pt states she has been doing good. Subjective:  HPI  Follow-up diabetes. Blood glucose log reviewed blood sugars have improved tremendously. She feels much better. Energy level improved vision improved. She is doing very well with diet and activity    Review of Systems   Constitutional: Negative for activity change, appetite change, fatigue and fever. HENT: Negative for congestion, rhinorrhea and sore throat. Eyes: Negative. Respiratory: Negative for cough, chest tightness and shortness of breath. Cardiovascular: Negative for chest pain and palpitations. Gastrointestinal: Negative for abdominal pain, diarrhea, nausea and vomiting. Genitourinary: Negative for dysuria and urgency. Musculoskeletal: Negative for arthralgias and back pain. Neurological: Negative for dizziness and headaches. Psychiatric/Behavioral: Negative for dysphoric mood. The patient is not nervous/anxious.       Past Medical History:   Diagnosis Date    Depression     Hypertension       Past Surgical History:   Procedure Laterality Date    SKIN CANCER EXCISION N/A      Family History   Problem Relation Age of Onset   [place] Cancer Sister         breast    High Cholesterol Mother     Heart Disease Father     Cancer Father         Bladder Cancer    Stroke Father     Cancer Sister         Breast Cancer    Cancer Sister         Breast Cancer    Cancer Maternal Grandmother         Breast Cancer    Cancer Paternal Grandmother         Breast Cancer     Social History     Tobacco Use    Smoking status: Never Smoker    Smokeless tobacco: Never Used Substance Use Topics    Alcohol use: Yes     Comment: occasional      Current Outpatient Medications   Medication Sig Dispense Refill    empagliflozin (JARDIANCE) 25 MG tablet Take 25 mg by mouth daily 90 tablet 3    metFORMIN (GLUCOPHAGE-XR) 500 MG extended release tablet Take 2 tablets by mouth daily (with breakfast) 180 tablet 3    blood glucose test strips (GLUCOSE METER TEST) strip Test 1x daily 100 each 3    Lancets MISC 1 each by Does not apply route daily 100 each 5    busPIRone (BUSPAR) 15 MG tablet Take 15 mg by mouth daily 90 tablet 3    losartan (COZAAR) 50 MG tablet TAKE ONE TABLET BY MOUTH ONCE DAILY 90 tablet 3    pantoprazole (PROTONIX) 40 MG tablet Take 1 tablet by mouth daily 90 tablet 3    amLODIPine (NORVASC) 5 MG tablet TAKE ONE TABLET BY MOUTH ONCE DAILY 90 tablet 3    DULoxetine (CYMBALTA) 60 MG extended release capsule TAKE one CAPSULE BY MOUTH ONCE DAILY 90 capsule 3    atorvastatin (LIPITOR) 20 MG tablet TAKE ONE TABLET BY MOUTH ONCE DAILY 90 tablet 3     No current facility-administered medications for this visit.       Allergies   Allergen Reactions    Lisinopril Other (See Comments)     Dry mouth    Sulfa Antibiotics Hives and Other (See Comments)     hypotension     Health Maintenance   Topic Date Due    Diabetic foot exam  10/05/1957    Diabetic retinal exam  10/05/1957    Shingles Vaccine (2 of 3) 12/20/2014    Annual Wellness Visit (AWV)  05/29/2019    A1C test (Diabetic or Prediabetic)  02/20/2021    Diabetic microalbuminuria test  11/20/2021    Lipid screen  11/20/2021    Potassium monitoring  11/20/2021    Creatinine monitoring  11/20/2021    Breast cancer screen  09/10/2022    DTaP/Tdap/Td vaccine (2 - Td) 01/08/2026    Colon cancer screen colonoscopy  06/08/2027    DEXA (modify frequency per FRAX score)  Completed    Flu vaccine  Completed    Pneumococcal 65+ years Vaccine  Completed    Hepatitis C screen  Completed    Hepatitis A vaccine  Aged Out  Hib vaccine  Aged Out    Meningococcal (ACWY) vaccine  Aged Out         Objective:     Physical Exam  Constitutional:       General: She is not in acute distress. Appearance: She is well-developed. She is not diaphoretic. HENT:      Head: Normocephalic and atraumatic. Eyes:      General: No scleral icterus. Conjunctiva/sclera: Conjunctivae normal.   Neck:      Thyroid: No thyromegaly. Vascular: No JVD. Comments: No bruits  Cardiovascular:      Rate and Rhythm: Normal rate and regular rhythm. Heart sounds: Normal heart sounds. Pulmonary:      Effort: Pulmonary effort is normal. No respiratory distress. Breath sounds: Normal breath sounds. No wheezing or rales. Abdominal:      Palpations: Abdomen is soft. There is no mass. Tenderness: There is no abdominal tenderness. There is no guarding. Musculoskeletal:         General: No tenderness. Skin:     General: Skin is warm and dry. Findings: No rash. Neurological:      Mental Status: She is alert and oriented to person, place, and time. Cranial Nerves: No cranial nerve deficit. /72   Pulse 80   Temp 97 °F (36.1 °C)   Resp 16   Ht 5' 4\" (1.626 m)   Wt 163 lb 6.4 oz (74.1 kg)   SpO2 98%   BMI 28.05 kg/m²       Impression/Plan:  1.  Type 2 diabetes mellitus with hyperglycemia, without long-term current use of insulin ([facility])      Requested Prescriptions     Signed Prescriptions Disp Refills    empagliflozin (JARDIANCE) 25 MG tablet 90 tablet 3     Sig: Take 25 mg by mouth daily    metFORMIN (GLUCOPHAGE-XR) 500 MG extended release tablet 180 tablet 3     Sig: Take 2 tablets by mouth daily (with breakfast)     Orders Placed This Encounter   Procedures    Lipid Panel     Standing Status:   Future     Standing Expiration Date:   12/21/2021     Order Specific Question:   Is Patient Fasting?/# of Hours     Answer:   yes/12    Hemoglobin A1C     Standing Status:   Future

## 2021-01-05 ENCOUNTER — OFFICE VISIT (OUTPATIENT)
Dept: INTERNAL MEDICINE CLINIC | Age: 76
End: 2021-01-05
Payer: MEDICARE

## 2021-01-05 VITALS
SYSTOLIC BLOOD PRESSURE: 120 MMHG | HEART RATE: 78 BPM | WEIGHT: 167.2 LBS | BODY MASS INDEX: 28.54 KG/M2 | HEIGHT: 64 IN | DIASTOLIC BLOOD PRESSURE: 68 MMHG | TEMPERATURE: 97 F

## 2021-01-05 DIAGNOSIS — E11.65 TYPE 2 DIABETES MELLITUS WITH HYPERGLYCEMIA, WITHOUT LONG-TERM CURRENT USE OF INSULIN (HCC): Primary | ICD-10-CM

## 2021-01-05 LAB — HBA1C MFR BLD: NORMAL % (ref 4.3–5.7)

## 2021-01-05 PROCEDURE — 83036 HEMOGLOBIN GLYCOSYLATED A1C: CPT | Performed by: INTERNAL MEDICINE

## 2021-01-05 PROCEDURE — G0108 DIAB MANAGE TRN  PER INDIV: HCPCS | Performed by: INTERNAL MEDICINE

## 2021-01-05 NOTE — PATIENT INSTRUCTIONS
Eat 3 meals per day            You want to have 45-50 grams of carbohydrate per meal                          (3 servings of carbohydrate)          --if you have a snack between meals--limit to only 15 grams of carb  Check your blood sugar daily. try to check blood sugar before dinner or bedtime 2-3 times per week            Blood sugar goal            (you can check morning and dinner one day and skip the next day)  Exercise at least 4-5 days per week            --try Sultana Adams video--10 minute chair exercise --youtube              Or walking outdoors or the mall. Keep doing your stretches daily! Keep a log of what you are eating and how much. Bring this to your next                  Appointment.

## 2021-01-11 ENCOUNTER — NURSE ONLY (OUTPATIENT)
Dept: INTERNAL MEDICINE CLINIC | Age: 76
End: 2021-01-11
Payer: MEDICARE

## 2021-01-11 DIAGNOSIS — E11.9 DIABETES MELLITUS WITH NO COMPLICATION (HCC): ICD-10-CM

## 2021-01-11 PROCEDURE — 97804 MEDICAL NUTRITION GROUP: CPT | Performed by: DIETITIAN, REGISTERED

## 2021-01-11 NOTE — PROGRESS NOTES
Patient presented for the Diabetes Carbohydrate Counting Diet education class. The content was presented via PowerPoint, lecture and case-based format. Total time spent with patient 60 minutes. - Identifying carbohydrates, protein and fat. - How to read the food label - portion size and total carbohydrates. - What is considered a serving?   - How to count carbohydrates without a label, the \"15\" rule. - Consistent carbohydrate intake. - Counting carbohydrates when dining out.   - Decrease fat intake to achieve weight loss goals.

## 2021-01-19 ENCOUNTER — NURSE ONLY (OUTPATIENT)
Dept: INTERNAL MEDICINE CLINIC | Age: 76
End: 2021-01-19
Payer: MEDICARE

## 2021-01-19 DIAGNOSIS — E11.9 TYPE 2 DIABETES MELLITUS WITHOUT COMPLICATION, UNSPECIFIED WHETHER LONG TERM INSULIN USE (HCC): ICD-10-CM

## 2021-01-19 PROCEDURE — 97804 MEDICAL NUTRITION GROUP: CPT | Performed by: DIETITIAN, REGISTERED

## 2021-01-19 NOTE — PROGRESS NOTES
Patient presented for the Diabetes [facility]  education class. The content was presented via PowerPoint, lecture and case-based format covering the following concepts: 60mins education. · What is Diabetes?   · Define glycosolation  · Interpretation of the A1C and goal.  · Pancreatic function  · Target organs and macro and microvascular complications  · Goals for SGM BP goals  · Meal clearance   · S&S hyper/hypoglycemia and tx   · Insulin physiology-basal/bolus  · Navigating sick days stress  · Relationship between diet, exercise, meds and stress  ·  Utilizing shweta care system at appropriate time  · Assuming responsibility in self management  · Troubleshooting patterns

## 2021-03-22 ENCOUNTER — OFFICE VISIT (OUTPATIENT)
Dept: FAMILY MEDICINE CLINIC | Age: 76
End: 2021-03-22
Payer: MEDICARE

## 2021-03-22 ENCOUNTER — NURSE ONLY (OUTPATIENT)
Dept: LAB | Age: 76
End: 2021-03-22

## 2021-03-22 VITALS
HEART RATE: 72 BPM | DIASTOLIC BLOOD PRESSURE: 70 MMHG | HEIGHT: 64 IN | TEMPERATURE: 98 F | BODY MASS INDEX: 28.51 KG/M2 | WEIGHT: 167 LBS | SYSTOLIC BLOOD PRESSURE: 110 MMHG | RESPIRATION RATE: 20 BRPM

## 2021-03-22 DIAGNOSIS — E78.5 HYPERLIPIDEMIA, UNSPECIFIED HYPERLIPIDEMIA TYPE: ICD-10-CM

## 2021-03-22 DIAGNOSIS — E11.65 TYPE 2 DIABETES MELLITUS WITH HYPERGLYCEMIA, WITHOUT LONG-TERM CURRENT USE OF INSULIN (HCC): Primary | ICD-10-CM

## 2021-03-22 DIAGNOSIS — E11.65 TYPE 2 DIABETES MELLITUS WITH HYPERGLYCEMIA, WITHOUT LONG-TERM CURRENT USE OF INSULIN (HCC): ICD-10-CM

## 2021-03-22 LAB
ALBUMIN SERPL-MCNC: 4.1 G/DL (ref 3.5–5.1)
ALP BLD-CCNC: 90 U/L (ref 38–126)
ALT SERPL-CCNC: 16 U/L (ref 11–66)
ANION GAP SERPL CALCULATED.3IONS-SCNC: 12 MEQ/L (ref 8–16)
AST SERPL-CCNC: 17 U/L (ref 5–40)
AVERAGE GLUCOSE: 174 MG/DL (ref 70–126)
BILIRUB SERPL-MCNC: 0.7 MG/DL (ref 0.3–1.2)
BUN BLDV-MCNC: 17 MG/DL (ref 7–22)
CALCIUM SERPL-MCNC: 9.4 MG/DL (ref 8.5–10.5)
CHLORIDE BLD-SCNC: 103 MEQ/L (ref 98–111)
CHOLESTEROL, TOTAL: 190 MG/DL (ref 100–199)
CO2: 27 MEQ/L (ref 23–33)
CREAT SERPL-MCNC: 0.7 MG/DL (ref 0.4–1.2)
GFR SERPL CREATININE-BSD FRML MDRD: 82 ML/MIN/1.73M2
GLUCOSE BLD-MCNC: 186 MG/DL (ref 70–108)
HBA1C MFR BLD: 7.8 % (ref 4.4–6.4)
HDLC SERPL-MCNC: 54 MG/DL
LDL CHOLESTEROL CALCULATED: 112 MG/DL
POTASSIUM SERPL-SCNC: 4 MEQ/L (ref 3.5–5.2)
SODIUM BLD-SCNC: 142 MEQ/L (ref 135–145)
TOTAL PROTEIN: 7.1 G/DL (ref 6.1–8)
TRIGL SERPL-MCNC: 118 MG/DL (ref 0–199)

## 2021-03-22 PROCEDURE — 99213 OFFICE O/P EST LOW 20 MIN: CPT | Performed by: FAMILY MEDICINE

## 2021-03-22 PROCEDURE — 3051F HG A1C>EQUAL 7.0%<8.0%: CPT | Performed by: FAMILY MEDICINE

## 2021-03-28 ASSESSMENT — ENCOUNTER SYMPTOMS
EYES NEGATIVE: 1
SHORTNESS OF BREATH: 0
RHINORRHEA: 0
NAUSEA: 0
CHEST TIGHTNESS: 0
SORE THROAT: 0
BACK PAIN: 0
VOMITING: 0
COUGH: 0
ABDOMINAL PAIN: 0
DIARRHEA: 0

## 2021-03-28 NOTE — PROGRESS NOTES
[facility]  [address]  [facility] [place] [address]  Dept: [phone]  Dept Fax: [phone]  Loc: [phone]  PROGRESS NOTE      VisitDate: 3/22/2021    Mari Pizano is a 70 y.o. female who presents today for:     Chief Complaint   Patient presents with    3 Month Follow-Up     DM, ([facility]) Hyperlipidemia, HTN. Due for DM foot and eye exam.     Labs Only     had labs this morning         Subjective:  HPI  Follow-up diabetes, blood sugars are improving. She is attending the diabetic clinic and continuing her education and expanding her knowledge base regarding sugars in her diet. Follow-up hyperlipidemia, labs reviewed with the patient improving. Review of Systems   Constitutional: Negative for activity change, appetite change, fatigue and fever. HENT: Negative for congestion, rhinorrhea and sore throat. Eyes: Negative. Respiratory: Negative for cough, chest tightness and shortness of breath. Cardiovascular: Negative for chest pain and palpitations. Gastrointestinal: Negative for abdominal pain, diarrhea, nausea and vomiting. Genitourinary: Negative for dysuria and urgency. Musculoskeletal: Negative for arthralgias and back pain. Neurological: Negative for dizziness and headaches. Psychiatric/Behavioral: Negative for dysphoric mood. The patient is not nervous/anxious.       Past Medical History:   Diagnosis Date    Depression     Hypertension       Past Surgical History:   Procedure Laterality Date    SKIN CANCER EXCISION N/A      Family History   Problem Relation Age of Onset    Cancer Sister         breast    High Cholesterol Mother     Heart Disease Father     Cancer Father         Bladder Cancer    Stroke Father     Cancer Sister         Breast Cancer    Cancer Sister         Breast Cancer    Cancer Maternal Grandmother         Breast Cancer    Cancer Paternal Grandmother         Breast Cancer Social History     Tobacco Use    Smoking status: Never Smoker    Smokeless tobacco: Never Used   Substance Use Topics    Alcohol use: Yes     Comment: occasional      Current Outpatient Medications   Medication Sig Dispense Refill    Multiple Vitamin (MULTIVITAMIN PO) Take 2 tablets by mouth daily Shauna Evans      Magnesium Chloride (MAGNESIUM DR PO) Take 250 mg by mouth daily       empagliflozin (JARDIANCE) 25 MG tablet Take 25 mg by mouth daily 90 tablet 3    metFORMIN (GLUCOPHAGE-XR) 500 MG extended release tablet Take 2 tablets by mouth daily (with breakfast) 180 tablet 3    blood glucose test strips (GLUCOSE METER TEST) strip Test 1x daily 100 each 3    Lancets MISC 1 each by Does not apply route daily 100 each 5    busPIRone (BUSPAR) 15 MG tablet Take 15 mg by mouth daily 90 tablet 3    losartan (COZAAR) 50 MG tablet TAKE ONE TABLET BY MOUTH ONCE DAILY 90 tablet 3    pantoprazole (PROTONIX) 40 MG tablet Take 1 tablet by mouth daily 90 tablet 3    amLODIPine (NORVASC) 5 MG tablet TAKE ONE TABLET BY MOUTH ONCE DAILY 90 tablet 3    DULoxetine (CYMBALTA) 60 MG extended release capsule TAKE one CAPSULE BY MOUTH ONCE DAILY 90 capsule 3    atorvastatin (LIPITOR) 20 MG tablet TAKE ONE TABLET BY MOUTH ONCE DAILY 90 tablet 3     No current facility-administered medications for this visit.       Allergies   Allergen Reactions    Lisinopril Other (See Comments)     Dry mouth    Sulfa Antibiotics Hives and Other (See Comments)     hypotension     Health Maintenance   Topic Date Due    Diabetic foot exam  Never done    Diabetic retinal exam  Never done    Shingles Vaccine (2 of 3) 12/20/2014    Annual Wellness Visit (AWV)  Never done    Diabetic microalbuminuria test  11/20/2021    A1C test (Diabetic or Prediabetic)  03/22/2022    Lipid screen  03/22/2022    Potassium monitoring  03/22/2022    Creatinine monitoring  03/22/2022    Breast cancer screen  09/10/2022    DTaP/Tdap/Td vaccine (2 - Td) 01/08/2026    Colon cancer screen colonoscopy  06/08/2027    DEXA (modify frequency per FRAX score)  Completed    Flu vaccine  Completed    Pneumococcal 65+ years Vaccine  Completed    COVID-19 Vaccine  Completed    Hepatitis C screen  Completed    Hepatitis A vaccine  Aged Out    Hib vaccine  Aged Out    Meningococcal (ACWY) vaccine  Aged Out         Objective:     Physical Exam  Constitutional:       General: She is not in acute distress. Appearance: She is well-developed. She is not diaphoretic. HENT:      Head: Normocephalic and atraumatic. Eyes:      General: No scleral icterus. Conjunctiva/sclera: Conjunctivae normal.   Neck:      Thyroid: No thyromegaly. Vascular: No JVD. Comments: No bruits  Cardiovascular:      Rate and Rhythm: Normal rate and regular rhythm. Heart sounds: Normal heart sounds. Pulmonary:      Effort: Pulmonary effort is normal. No respiratory distress. Breath sounds: Normal breath sounds. No wheezing or rales. Abdominal:      Palpations: Abdomen is soft. There is no mass. Tenderness: There is no abdominal tenderness. There is no guarding. Musculoskeletal:         General: No tenderness. Skin:     General: Skin is warm and dry. Findings: No rash. Neurological:      Mental Status: She is alert and oriented to person, place, and time. Cranial Nerves: No cranial nerve deficit. /70   Pulse 72   Temp 98 °F (36.7 °C) (Oral)   Resp 20   Ht 5' 4\" (1.626 m)   Wt 167 lb (75.8 kg)   BMI 28.67 kg/m²       Impression/Plan:  1. Type 2 diabetes mellitus with hyperglycemia, without long-term current use of insulin ([facility] [address]    2. Hyperlipidemia, unspecified hyperlipidemia type      Requested Prescriptions      No prescriptions requested or ordered in this encounter     No orders of the defined types were placed in this encounter.   Labs as previously ordered, counseled on diet and exercise    Patient giveneducational materials - see patient instructions. Discussed use, benefit, and side effects of prescribed medications. All patient questions answered. Pt voiced understanding. Reviewed health maintenance. Patient agreedwith treatment plan. Follow up as directed. **This report has been created using voice recognition software. It may contain minor errorswhich are inherent in voice recognition technology. **       Electronically signed by Nadia Patterson MD on 3/28/2021 at 11:57 AM

## 2021-04-13 ENCOUNTER — TELEPHONE (OUTPATIENT)
Dept: FAMILY MEDICINE CLINIC | Age: 76
End: 2021-04-13

## 2021-04-13 DIAGNOSIS — E11.65 TYPE 2 DIABETES MELLITUS WITH HYPERGLYCEMIA, WITHOUT LONG-TERM CURRENT USE OF INSULIN (HCC): Primary | ICD-10-CM

## 2021-04-13 NOTE — TELEPHONE ENCOUNTER
----- Message from Isamar Stauffer MD sent at 4/12/2021  7:00 AM EDT -----  Labs improved, recommend repeat hemoglobin A1c, CMP in 6 months

## 2021-09-19 NOTE — PROGRESS NOTES
Diabetes Mellitus Type II, Initial Visit:   Dr. Shelley Olsen  Patient here for an initial evaluation of Type 2 diabetes mellitus. Current symptoms/problems include none. The patient was initially diagnosed with Type 2 diabetes mellitus 9/2020. Known diabetic complications: none  Cardiovascular risk factors: advanced age (older than 54 for men, 72 for women) and diabetes mellitus  Current diabetic medications include Jardiance, Metformin. Eye exam current (within one year): no. Karey Sandy. Last exam 4 years  Weight trend: lost approx 40lbs in 2020-intentional.                Stable for the past 2-3 months  Prior visit with dietician: no  Current diet: B Raisin Bran 2/3 cup/ milk                   L bagel/ cream cheese                   D 2 fish sandwiches                   Snack -rare- pretzels; apple-pb; popcorn                   Water- SF drink mix  Current exercise: ADL's- low to moderate activity  Current monitoring regimen: Home BG testing once daily  Home blood sugar records: fasting range: 169-173   Blood sugar in office 1.5 hrpp 219 50 grams carb  Any episodes of hypoglycemia? Unknown                   Has passed out 4 times in 2020 before              Being diagnosed with diabetes  Is She on ACE inhibitor or angiotensin II receptor blocker? Yes   losartan (Cozaar)     Focus  Diabetes education initial visit. Recent A1C 9.8%; improving from 14%. A1C completed in error today 8.7%; but deleted. So she is continuing to improve; but remains above goal. Diagnosis of DM 9/2020/ She is not setting limits on carbs, but overall is staying within goals. There is no exercise in place. Jason Jones is checking blood sugars daily-mostly fasting. She is is anxious to better understand her meal plan and how to manage her Diabetes. Follow up classes in next 1-3 weeks. Much encouragement given. Plan:  Reviewed physiology of Diabetes; medication actions; exercise; meal plan-carbs; SGM/ timing.   Eat 3 meals per day You want to have 45-50 grams of carbohydrate per meal                          (3 servings of carbohydrate)          --if you have a snack between meals--limit to only 15 grams of carb  Check your blood sugar daily. try to check blood sugar before dinner or bedtime 2-3 times per week            Blood sugar goal            (you can check morning and dinner one day and skip the next day)  Exercise at least 4-5 days per week            --try Kimberly Watkins video--10 minute chair exercise --youtube              Or walking outdoors or the mall. Keep doing your stretches daily! Keep a log of what you are eating and how much. Bring this to your next                  Appointment. Luz Avila voiced understanding of above instructions via teach back and willingness to participate in the above plan of care. Time spent in direct contact with patient 60 minutes. Private Auto Walk in

## 2021-10-02 DIAGNOSIS — E11.65 TYPE 2 DIABETES MELLITUS WITH HYPERGLYCEMIA, WITHOUT LONG-TERM CURRENT USE OF INSULIN (HCC): ICD-10-CM

## 2021-10-04 RX ORDER — AMLODIPINE BESYLATE 5 MG/1
TABLET ORAL
Qty: 90 TABLET | Refills: 0 | Status: SHIPPED | OUTPATIENT
Start: 2021-10-04 | End: 2022-02-09

## 2021-10-04 RX ORDER — DULOXETIN HYDROCHLORIDE 60 MG/1
CAPSULE, DELAYED RELEASE ORAL
Qty: 90 CAPSULE | Refills: 0 | Status: SHIPPED | OUTPATIENT
Start: 2021-10-04 | End: 2022-02-09

## 2021-10-04 RX ORDER — LOSARTAN POTASSIUM 50 MG/1
TABLET ORAL
Qty: 90 TABLET | Refills: 0 | Status: SHIPPED | OUTPATIENT
Start: 2021-10-04 | End: 2022-02-09

## 2021-10-04 RX ORDER — PANTOPRAZOLE SODIUM 40 MG/1
TABLET, DELAYED RELEASE ORAL
Qty: 90 TABLET | Refills: 0 | Status: SHIPPED | OUTPATIENT
Start: 2021-10-04 | End: 2022-02-09

## 2021-10-04 RX ORDER — BUSPIRONE HYDROCHLORIDE 15 MG/1
TABLET ORAL
Qty: 90 TABLET | Refills: 0 | Status: SHIPPED | OUTPATIENT
Start: 2021-10-04 | End: 2022-02-09

## 2021-10-04 RX ORDER — BLOOD SUGAR DIAGNOSTIC
STRIP MISCELLANEOUS
Qty: 50 EACH | Refills: 5 | Status: SHIPPED | OUTPATIENT
Start: 2021-10-04 | End: 2022-02-24 | Stop reason: SDUPTHER

## 2021-10-04 RX ORDER — ATORVASTATIN CALCIUM 20 MG/1
TABLET, FILM COATED ORAL
Qty: 90 TABLET | Refills: 0 | Status: SHIPPED | OUTPATIENT
Start: 2021-10-04 | End: 2022-02-09

## 2021-10-30 ENCOUNTER — NURSE ONLY (OUTPATIENT)
Dept: LAB | Age: 76
End: 2021-10-30

## 2021-10-30 DIAGNOSIS — E11.65 TYPE 2 DIABETES MELLITUS WITH HYPERGLYCEMIA, WITHOUT LONG-TERM CURRENT USE OF INSULIN (HCC): ICD-10-CM

## 2021-10-30 LAB
ALBUMIN SERPL-MCNC: 4.6 G/DL (ref 3.5–5.1)
ALP BLD-CCNC: 105 U/L (ref 38–126)
ALT SERPL-CCNC: 16 U/L (ref 11–66)
ANION GAP SERPL CALCULATED.3IONS-SCNC: 14 MEQ/L (ref 8–16)
AST SERPL-CCNC: 15 U/L (ref 5–40)
BILIRUB SERPL-MCNC: 0.8 MG/DL (ref 0.3–1.2)
BUN BLDV-MCNC: 17 MG/DL (ref 7–22)
CALCIUM SERPL-MCNC: 9.6 MG/DL (ref 8.5–10.5)
CHLORIDE BLD-SCNC: 101 MEQ/L (ref 98–111)
CO2: 27 MEQ/L (ref 23–33)
CREAT SERPL-MCNC: 0.9 MG/DL (ref 0.4–1.2)
GFR SERPL CREATININE-BSD FRML MDRD: 61 ML/MIN/1.73M2
GLUCOSE BLD-MCNC: 211 MG/DL (ref 70–108)
POTASSIUM SERPL-SCNC: 4.2 MEQ/L (ref 3.5–5.2)
SODIUM BLD-SCNC: 142 MEQ/L (ref 135–145)
TOTAL PROTEIN: 7 G/DL (ref 6.1–8)

## 2021-11-05 ENCOUNTER — HOSPITAL ENCOUNTER (OUTPATIENT)
Dept: WOMENS IMAGING | Age: 76
Discharge: HOME | End: 2021-11-05
Payer: MEDICARE

## 2021-11-05 DIAGNOSIS — Z12.31 VISIT FOR SCREENING MAMMOGRAM: ICD-10-CM

## 2021-11-05 PROCEDURE — 77063 BREAST TOMOSYNTHESIS BI: CPT

## 2021-11-11 ENCOUNTER — TELEPHONE (OUTPATIENT)
Dept: FAMILY MEDICINE CLINIC | Age: 76
End: 2021-11-11

## 2021-11-11 NOTE — TELEPHONE ENCOUNTER
----- Message from Declan Carr MD sent at 11/10/2021  4:48 PM EST -----  Need fu appt if none scheduled. Not urgent.  Do a1c at visit

## 2021-11-18 ENCOUNTER — OFFICE VISIT (OUTPATIENT)
Dept: FAMILY MEDICINE CLINIC | Age: 76
End: 2021-11-18
Payer: MEDICARE

## 2021-11-18 VITALS
RESPIRATION RATE: 20 BRPM | SYSTOLIC BLOOD PRESSURE: 106 MMHG | HEIGHT: 66 IN | WEIGHT: 166 LBS | HEART RATE: 72 BPM | BODY MASS INDEX: 26.68 KG/M2 | DIASTOLIC BLOOD PRESSURE: 68 MMHG | TEMPERATURE: 98.2 F

## 2021-11-18 DIAGNOSIS — E11.65 TYPE 2 DIABETES MELLITUS WITH HYPERGLYCEMIA, WITHOUT LONG-TERM CURRENT USE OF INSULIN (HCC): ICD-10-CM

## 2021-11-18 DIAGNOSIS — E78.5 HYPERLIPIDEMIA, UNSPECIFIED HYPERLIPIDEMIA TYPE: ICD-10-CM

## 2021-11-18 DIAGNOSIS — Z00.00 ROUTINE GENERAL MEDICAL EXAMINATION AT A HEALTH CARE FACILITY: Primary | ICD-10-CM

## 2021-11-18 DIAGNOSIS — I10 PRIMARY HYPERTENSION: Chronic | ICD-10-CM

## 2021-11-18 LAB — HBA1C MFR BLD: 9.5 %

## 2021-11-18 PROCEDURE — G0438 PPPS, INITIAL VISIT: HCPCS | Performed by: FAMILY MEDICINE

## 2021-11-18 PROCEDURE — 83036 HEMOGLOBIN GLYCOSYLATED A1C: CPT | Performed by: FAMILY MEDICINE

## 2021-11-18 RX ORDER — METFORMIN HYDROCHLORIDE 500 MG/1
1000 TABLET, EXTENDED RELEASE ORAL
Qty: 180 TABLET | Refills: 3 | Status: SHIPPED | OUTPATIENT
Start: 2021-11-18 | End: 2022-08-19 | Stop reason: SDUPTHER

## 2021-11-18 RX ORDER — EMPAGLIFLOZIN 25 MG/1
25 TABLET, FILM COATED ORAL DAILY
Qty: 90 TABLET | Refills: 3 | Status: SHIPPED | OUTPATIENT
Start: 2021-11-18 | End: 2022-08-19 | Stop reason: SDUPTHER

## 2021-11-18 RX ORDER — GLIMEPIRIDE 2 MG/1
2 TABLET ORAL EVERY MORNING
Qty: 90 TABLET | Refills: 3 | Status: SHIPPED | OUTPATIENT
Start: 2021-11-18 | End: 2022-08-19 | Stop reason: SDUPTHER

## 2021-11-18 SDOH — ECONOMIC STABILITY: FOOD INSECURITY: WITHIN THE PAST 12 MONTHS, THE FOOD YOU BOUGHT JUST DIDN'T LAST AND YOU DIDN'T HAVE MONEY TO GET MORE.: NEVER TRUE

## 2021-11-18 SDOH — ECONOMIC STABILITY: FOOD INSECURITY: WITHIN THE PAST 12 MONTHS, YOU WORRIED THAT YOUR FOOD WOULD RUN OUT BEFORE YOU GOT MONEY TO BUY MORE.: NEVER TRUE

## 2021-11-18 ASSESSMENT — LIFESTYLE VARIABLES: HOW OFTEN DO YOU HAVE A DRINK CONTAINING ALCOHOL: 0

## 2021-11-18 ASSESSMENT — PATIENT HEALTH QUESTIONNAIRE - PHQ9
SUM OF ALL RESPONSES TO PHQ QUESTIONS 1-9: 2
1. LITTLE INTEREST OR PLEASURE IN DOING THINGS: 1
SUM OF ALL RESPONSES TO PHQ QUESTIONS 1-9: 2
2. FEELING DOWN, DEPRESSED OR HOPELESS: 1
SUM OF ALL RESPONSES TO PHQ9 QUESTIONS 1 & 2: 2
SUM OF ALL RESPONSES TO PHQ QUESTIONS 1-9: 2

## 2021-11-18 ASSESSMENT — SOCIAL DETERMINANTS OF HEALTH (SDOH): HOW HARD IS IT FOR YOU TO PAY FOR THE VERY BASICS LIKE FOOD, HOUSING, MEDICAL CARE, AND HEATING?: NOT VERY HARD

## 2021-11-19 NOTE — PROGRESS NOTES
Medicare Annual Wellness Visit  Name: Christy Kim Date: 2021   MRN: [id number] Sex: Female   Age: 78 y.o. Ethnicity: Non- / Non    : 1945 Race: White (non-)      Raj Dawn is here for Medicare AWV (Due for Dm foot and eye exam), Discuss Labs, and Medication Refill    Screenings for behavioral, psychosocial and functional/safety risks, and cognitive dysfunction are all negative except as indicated below. These results, as well as other patient data from the [address] form, are documented in Flowsheets linked to this Encounter. Allergies   Allergen Reactions    Lisinopril Other (See Comments)     Dry mouth    Sulfa Antibiotics Hives and Other (See Comments)     hypotension       Prior to Visit Medications    Medication Sig Taking?  Authorizing Provider   empagliflozin (JARDIANCE) 25 MG tablet Take 25 mg by mouth daily Yes Luana Swenson MD   metFORMIN (GLUCOPHAGE-XR) 500 MG extended release tablet Take 2 tablets by mouth daily (with breakfast) Yes Luana Swenson MD   glimepiride (AMARYL) 2 MG tablet Take 1 tablet by mouth every morning Yes Luana Swenson MD   atorvastatin (LIPITOR) 20 MG tablet Take 1 tablet by mouth once daily Yes Luana Swenson MD   pantoprazole (PROTONIX) 40 MG tablet Take 1 tablet by mouth once daily Yes Luana Swenson MD   blood glucose test strips (ONETOUCH VERIO) strip USE 1 STRIP TO CHECK GLUCOSE ONCE DAILY Yes Luana Swenson MD   amLODIPine (NORVASC) 5 MG tablet Take 1 tablet by mouth once daily Yes Luana Swenson MD   busPIRone (BUSPAR) 15 MG tablet Take 1 tablet by mouth once daily Yes Luana Swenson MD   DULoxetine (CYMBALTA) 60 MG extended release capsule Take 1 capsule by mouth once daily Yes Luana Swenson MD   losartan (COZAAR) 50 MG tablet Take 1 tablet by mouth once daily Yes Luana Swenson MD   Multiple Vitamin (MULTIVITAMIN PO) Take 2 tablets by mouth daily Laya Hicks Yes Historical Provider, MD   Magnesium Chloride (MAGNESIUM DR PO) Take 250 mg by mouth daily  Yes Historical Provider, MD   Lancets MISC 1 each by Does not apply route daily Yes Declan Carr MD       Past Medical History:   Diagnosis Date    Depression     Hypertension        Past Surgical History:   Procedure Laterality Date    SKIN CANCER EXCISION N/A        Family History   Problem Relation Age of Onset    Breast Cancer Sister 72    High Cholesterol Mother     Heart Disease Father     Cancer Father         Bladder Cancer    Stroke Father     Breast Cancer Sister 71    Breast Cancer Sister 62    Breast Cancer Maternal Grandmother     Breast Cancer Paternal Grandmother     Breast Cancer Paternal Aunt 47    Breast Cancer Paternal Cousin 62    Breast Cancer Niece 52       CareTeam (Including outside providers/suppliers regularly involved in providing care):   Patient Care Team:  Declan Carr MD as PCP - General (Family Medicine)  Declan Carr MD as PCP - [facility] Empaneled Provider    Wt Readings from Last 3 Encounters:   11/18/21 166 lb (75.3 kg)   03/22/21 167 lb (75.8 kg)   01/05/21 167 lb 3.2 oz (75.8 kg)     Vitals:    11/18/21 1158   BP: 106/68   Pulse: 72   Resp: 20   Temp: 98.2 °F (36.8 °C)   TempSrc: Oral   Weight: 166 lb (75.3 kg)   Height: 5' 5.75\" (1.67 m)     Body mass index is 27 kg/m². Based upon direct observation of the patient, evaluation of cognition reveals recent and remote memory intact.     General Appearance: alert and oriented to person, place and time, well developed and well- nourished, in no acute distress  Skin: warm and dry, no rash or erythema  Head: normocephalic and atraumatic  Eyes: pupils equal, round, and reactive to light, extraocular eye movements intact, conjunctivae normal  ENT: tympanic membrane, external ear and ear canal normal bilaterally, nose without deformity, nasal mucosa and turbinates normal without polyps  Neck: supple and non-tender without mass, no thyromegaly or thyroid nodules, no cervical lymphadenopathy  Pulmonary/Chest: clear to auscultation bilaterally- no wheezes, rales or rhonchi, normal air movement, no respiratory distress  Cardiovascular: normal rate, regular rhythm, normal S1 and S2, no murmurs, rubs, clicks, or gallops, distal pulses intact, no carotid bruits  Abdomen: soft, non-tender, non-distended, normal bowel sounds, no masses or organomegaly  Extremities: no cyanosis, clubbing or edema  Musculoskeletal: normal range of motion, no joint swelling, deformity or tenderness  Neurologic: reflexes normal and symmetric, no cranial nerve deficit, gait, coordination and speech normal    Patient's complete Health Risk Assessment and screening values have been reviewed and are found in Flowsheets. The following problems were reviewed today and where indicated follow up appointments were made and/or referrals ordered. Positive Risk Factor Screenings with Interventions:     Fall Risk:  2 or more falls in past year?: no  Fall with injury in past year?: (!) yes  Fall Risk Interventions:    · Home safety tips provided        General Health and ACP:  General  In general, how would you say your health is?: Very Good  In the past 7 days, have you experienced any of the following?  New or Increased Pain, New or Increased Fatigue, Loneliness, Social Isolation, Stress or Anger?: None of These  Do you get the social and emotional support that you need?: Yes  Do you have a Living Will?: (!) No  Advance Directives     Power of [address] [facility] Will ACP-Advance Directive ACP-Power of     Not on File Not on File Not on File Not on File      General Health Risk Interventions:  · No Living Will: Patient declines ACP discussion/assistance    Health Habits/Nutrition:  Health Habits/Nutrition  Do you exercise for at least 20 minutes 2-3 times per week?: (!) No  Have you lost any weight without trying in the past 3 months?: No  Do you eat only one meal per day?: No  Have you seen the dentist within the past year?: (!) No  Body mass index: (!) 26.99  Health Habits/Nutrition Interventions:  · Inadequate physical activity:  patient is not ready to increase his/her physical activity level at this time    Hearing/Vision:  No exam data present  Hearing/Vision  Do you or your family notice any trouble with your hearing that hasn't been managed with hearing aids?: (!) Yes  Do you have difficulty driving, watching TV, or doing any of your daily activities because of your eyesight?: No  Have you had an eye exam within the past year?: (!) No  Hearing/Vision Interventions:  · na      Personalized Preventive Plan   Current Health Maintenance Status  Immunization History   Administered Date(s) Administered    COVID-19, Jeevan Garcia, Primary or Immunocompromised, PF, 100mcg/0.5mL 02/09/2021, 03/09/2021    Influenza 10/23/2012    Influenza Vaccine, unspecified formulation 10/12/2015    Influenza Virus Vaccine 11/14/2013, 10/25/2014, 08/26/2017, 09/27/2018    Influenza, Quadv, adjuvanted, 65 yrs +, IM, PF (Fluad) 08/29/2020, 10/20/2021    Influenza, Triv, inactivated, subunit, adjuvanted, IM (Fluad 65 yrs and older) 10/23/2019    Pneumococcal Conjugate 13-valent ([id number]) 03/17/2016    Pneumococcal Polysaccharide ([id number]) 10/23/2012    Tdap (Boostrix, Adacel) 01/08/2016    Zoster Live (Zostavax) 10/25/2014        Health Maintenance   Topic Date Due    Diabetic foot exam  Never done    Diabetic retinal exam  Never done    Shingles Vaccine (2 of 3) 12/20/2014    Annual Wellness Visit (AWV)  Never done    COVID-19 Vaccine (3 - Booster for Moderna series) 09/09/2021    Diabetic microalbuminuria test  11/20/2021    A1C test (Diabetic or Prediabetic)  02/18/2022    Lipid screen  03/22/2022    Potassium monitoring  10/30/2022    Creatinine monitoring  10/30/2022    Breast cancer screen  11/05/2022    DTaP/Tdap/Td vaccine (2 - Td or Tdap) 01/08/2026    Colon cancer screen colonoscopy 06/08/2027    DEXA (modify frequency per FRAX score)  Completed    Flu vaccine  Completed    Pneumococcal 65+ years Vaccine  Completed    Hepatitis C screen  Completed    Hepatitis A vaccine  Aged Out    Hib vaccine  Aged Out    Meningococcal (ACWY) vaccine  Aged Out     Recommendations for [place] Due: see orders and patient instructions/AVS.  . Recommended screening schedule for the next 5-10 years is provided to the patient in written form: see Patient Malissa Abreu was seen today for medicare aw, discuss labs and medication refill. Diagnoses and all orders for this visit:    Routine general medical examination at a health care facility    Type 2 diabetes mellitus with hyperglycemia, without long-term current use of insulin (HCC)  -     POCT glycosylated hemoglobin (Hb A1C)  -     empagliflozin (JARDIANCE) 25 MG tablet; Take 25 mg by mouth daily  -     metFORMIN (GLUCOPHAGE-XR) 500 MG extended release tablet; Take 2 tablets by mouth daily (with breakfast)    Primary hypertension    Hyperlipidemia, unspecified hyperlipidemia type    Other orders  -     glimepiride (AMARYL) 2 MG tablet; Take 1 tablet by mouth every morning             Patient limited financially for initial medications regarding her diabetes worse as above  Seen today for wellness visit. Discussed the importance of a healthy life style. Balanced diet, nutrition, physical activity,and injury prevention. Also discussed the importance of up to date immunizations and annual screenings.

## 2021-11-19 NOTE — PATIENT INSTRUCTIONS
Personalized Preventive Plan for Odilia Rizvi - 11/18/2021  Medicare offers a range of preventive health benefits. Some of the tests and screenings are paid in full while other may be subject to a deductible, co-insurance, and/or copay. Some of these benefits include a comprehensive review of your medical history including lifestyle, illnesses that may run in your family, and various assessments and screenings as appropriate. After reviewing your medical record and screening and assessments performed today your provider may have ordered immunizations, labs, imaging, and/or referrals for you. A list of these orders (if applicable) as well as your Preventive Care list are included within your After Visit Summary for your review. Other Preventive Recommendations:    · A preventive eye exam performed by an eye specialist is recommended every 1-2 years to screen for glaucoma; cataracts, macular degeneration, and other eye disorders. · A preventive dental visit is recommended every 6 months. · Try to get at least 150 minutes of exercise per week or 10,000 steps per day on a pedometer . · Order or download the FREE \"Exercise & Physical Activity: Your Everyday Guide\" from [employer]. Call 0-881.420.5952 or search [employer] online. · You need 8078-7457 mg of calcium and 5766-9802 IU of vitamin D per day. It is possible to meet your calcium requirement with diet alone, but a vitamin D supplement is usually necessary to meet this goal.  · When exposed to the sun, use a sunscreen that protects against both UVA and UVB radiation with an SPF of 30 or greater. Reapply every 2 to 3 hours or after sweating, drying off with a towel, or swimming. · Always wear a seat belt when traveling in a car. Always wear a helmet when riding a bicycle or motorcycle.

## 2022-02-09 RX ORDER — PANTOPRAZOLE SODIUM 40 MG/1
TABLET, DELAYED RELEASE ORAL
Qty: 90 TABLET | Refills: 0 | Status: SHIPPED | OUTPATIENT
Start: 2022-02-09 | End: 2022-02-24 | Stop reason: SDUPTHER

## 2022-02-09 RX ORDER — LOSARTAN POTASSIUM 50 MG/1
TABLET ORAL
Qty: 90 TABLET | Refills: 0 | Status: SHIPPED | OUTPATIENT
Start: 2022-02-09 | End: 2022-02-24 | Stop reason: SDUPTHER

## 2022-02-09 RX ORDER — DULOXETIN HYDROCHLORIDE 60 MG/1
CAPSULE, DELAYED RELEASE ORAL
Qty: 90 CAPSULE | Refills: 0 | Status: SHIPPED | OUTPATIENT
Start: 2022-02-09 | End: 2022-02-24 | Stop reason: SDUPTHER

## 2022-02-09 RX ORDER — BUSPIRONE HYDROCHLORIDE 15 MG/1
TABLET ORAL
Qty: 90 TABLET | Refills: 0 | Status: SHIPPED | OUTPATIENT
Start: 2022-02-09 | End: 2022-02-24 | Stop reason: SDUPTHER

## 2022-02-09 RX ORDER — ATORVASTATIN CALCIUM 20 MG/1
TABLET, FILM COATED ORAL
Qty: 90 TABLET | Refills: 0 | Status: SHIPPED | OUTPATIENT
Start: 2022-02-09 | End: 2022-02-24 | Stop reason: SDUPTHER

## 2022-02-09 RX ORDER — AMLODIPINE BESYLATE 5 MG/1
TABLET ORAL
Qty: 90 TABLET | Refills: 0 | Status: SHIPPED | OUTPATIENT
Start: 2022-02-09 | End: 2022-02-24 | Stop reason: SDUPTHER

## 2022-02-24 ENCOUNTER — OFFICE VISIT (OUTPATIENT)
Dept: FAMILY MEDICINE CLINIC | Age: 77
End: 2022-02-24
Payer: MEDICARE

## 2022-02-24 VITALS
BODY MASS INDEX: 28.07 KG/M2 | TEMPERATURE: 98 F | HEART RATE: 81 BPM | RESPIRATION RATE: 16 BRPM | DIASTOLIC BLOOD PRESSURE: 68 MMHG | OXYGEN SATURATION: 96 % | HEIGHT: 65 IN | WEIGHT: 168.5 LBS | SYSTOLIC BLOOD PRESSURE: 118 MMHG

## 2022-02-24 DIAGNOSIS — E11.65 TYPE 2 DIABETES MELLITUS WITH HYPERGLYCEMIA, WITHOUT LONG-TERM CURRENT USE OF INSULIN (HCC): Primary | ICD-10-CM

## 2022-02-24 DIAGNOSIS — E78.5 HYPERLIPIDEMIA, UNSPECIFIED HYPERLIPIDEMIA TYPE: ICD-10-CM

## 2022-02-24 DIAGNOSIS — F33.42 MAJOR DEPRESSIVE DISORDER, RECURRENT, IN FULL REMISSION (HCC): ICD-10-CM

## 2022-02-24 DIAGNOSIS — I10 PRIMARY HYPERTENSION: Chronic | ICD-10-CM

## 2022-02-24 PROCEDURE — 99214 OFFICE O/P EST MOD 30 MIN: CPT | Performed by: FAMILY MEDICINE

## 2022-02-24 RX ORDER — DULOXETIN HYDROCHLORIDE 60 MG/1
CAPSULE, DELAYED RELEASE ORAL
Qty: 90 CAPSULE | Refills: 3 | Status: SHIPPED | OUTPATIENT
Start: 2022-02-24 | End: 2022-08-19 | Stop reason: SDUPTHER

## 2022-02-24 RX ORDER — LOSARTAN POTASSIUM 50 MG/1
TABLET ORAL
Qty: 90 TABLET | Refills: 3 | Status: SHIPPED | OUTPATIENT
Start: 2022-02-24 | End: 2022-08-19 | Stop reason: SDUPTHER

## 2022-02-24 RX ORDER — AMLODIPINE BESYLATE 5 MG/1
TABLET ORAL
Qty: 90 TABLET | Refills: 3 | Status: SHIPPED | OUTPATIENT
Start: 2022-02-24 | End: 2022-08-19 | Stop reason: SDUPTHER

## 2022-02-24 RX ORDER — BUSPIRONE HYDROCHLORIDE 15 MG/1
TABLET ORAL
Qty: 90 TABLET | Refills: 3 | Status: SHIPPED | OUTPATIENT
Start: 2022-02-24 | End: 2022-08-19 | Stop reason: SDUPTHER

## 2022-02-24 RX ORDER — ATORVASTATIN CALCIUM 20 MG/1
TABLET, FILM COATED ORAL
Qty: 90 TABLET | Refills: 3 | Status: SHIPPED | OUTPATIENT
Start: 2022-02-24 | End: 2022-08-19 | Stop reason: SDUPTHER

## 2022-02-24 RX ORDER — BLOOD SUGAR DIAGNOSTIC
STRIP MISCELLANEOUS
Qty: 100 EACH | Refills: 3 | Status: SHIPPED | OUTPATIENT
Start: 2022-02-24 | End: 2022-08-19 | Stop reason: SDUPTHER

## 2022-02-24 RX ORDER — PANTOPRAZOLE SODIUM 40 MG/1
TABLET, DELAYED RELEASE ORAL
Qty: 90 TABLET | Refills: 3 | Status: SHIPPED | OUTPATIENT
Start: 2022-02-24 | End: 2022-08-19 | Stop reason: SDUPTHER

## 2022-02-24 ASSESSMENT — ENCOUNTER SYMPTOMS
EYES NEGATIVE: 1
SHORTNESS OF BREATH: 0
SORE THROAT: 0
ABDOMINAL PAIN: 0
RHINORRHEA: 0
COUGH: 0
CHEST TIGHTNESS: 0
VOMITING: 0
NAUSEA: 0
DIARRHEA: 0
BACK PAIN: 0

## 2022-02-24 NOTE — PROGRESS NOTES
[address] [place] [facility] [address]  Dept: [phone]  Dept Fax: [phone]  Loc: [phone]  PROGRESS NOTE      VisitDate: 2/24/2022    Key Valiente is a 78 y.o. female who presents today for:     Chief Complaint   Patient presents with    3 Month Follow-Up     DM-due for labs, refill test strips-discuss increasing how often to test         Subjective:  HPI  Follow-up type 2 diabetes. Taking medications as prescribed. Blood glucose log reviewed most morning blood sugars are below 150. Taking medications as prescribed denies side effects. Follow-up hypertension Home blood pressure log reviewed running high bit at home reading today is normal take medications as prescribed denies side effects. Follow-up hyperlipidemia stable. Follow-up depression, remains in remission    Review of Systems   Constitutional: Negative for activity change, appetite change, fatigue and fever. HENT: Negative for congestion, rhinorrhea and sore throat. Eyes: Negative. Respiratory: Negative for cough, chest tightness and shortness of breath. Cardiovascular: Negative for chest pain and palpitations. Gastrointestinal: Negative for abdominal pain, diarrhea, nausea and vomiting. Genitourinary: Negative for dysuria and urgency. Musculoskeletal: Negative for arthralgias and back pain. Neurological: Negative for dizziness and headaches. Psychiatric/Behavioral: Negative for dysphoric mood. The patient is not nervous/anxious.       Past Medical History:   Diagnosis Date    Depression     Hypertension       Past Surgical History:   Procedure Laterality Date    SKIN CANCER EXCISION N/A      Family History   Problem Relation Age of Onset    Breast Cancer Sister 72    High Cholesterol Mother     Heart Disease Father     Cancer Father         Bladder Cancer    Stroke Father     Breast Cancer Sister 71    Breast Cancer Sister 62    Breast Cancer Maternal Grandmother     Breast Cancer Paternal Grandmother     Breast Cancer Paternal Aunt 47    Breast Cancer Paternal Cousin 62    Breast Cancer Niece 52     Social History     Tobacco Use    Smoking status: Never Smoker    Smokeless tobacco: Never Used   Substance Use Topics    Alcohol use: Not Currently     Comment: occasional      Current Outpatient Medications   Medication Sig Dispense Refill    blood glucose test strips (ONETOUCH VERIO) strip USE 1 STRIP TO CHECK GLUCOSE ONCE DAILY and as needed 100 each 3    busPIRone (BUSPAR) 15 MG tablet Take 1 tablet by mouth once daily 90 tablet 3    pantoprazole (PROTONIX) 40 MG tablet Take 1 tablet by mouth once daily 90 tablet 3    DULoxetine (CYMBALTA) 60 MG extended release capsule Take 1 capsule by mouth once daily 90 capsule 3    losartan (COZAAR) 50 MG tablet Take 1 tablet by mouth once daily 90 tablet 3    atorvastatin (LIPITOR) 20 MG tablet Take 1 tablet by mouth once daily 90 tablet 3    amLODIPine (NORVASC) 5 MG tablet Take 1 tablet by mouth once daily 90 tablet 3    empagliflozin (JARDIANCE) 25 MG tablet Take 25 mg by mouth daily 90 tablet 3    metFORMIN (GLUCOPHAGE-XR) 500 MG extended release tablet Take 2 tablets by mouth daily (with breakfast) 180 tablet 3    glimepiride (AMARYL) 2 MG tablet Take 1 tablet by mouth every morning 90 tablet 3    Multiple Vitamin (MULTIVITAMIN PO) Take 2 tablets by mouth daily Shauna VitaLea      Magnesium Chloride (MAGNESIUM DR PO) Take 250 mg by mouth daily       Lancets MISC 1 each by Does not apply route daily 100 each 5     No current facility-administered medications for this visit.      Allergies   Allergen Reactions    Lisinopril Other (See Comments)     Dry mouth    Sulfa Antibiotics Hives and Other (See Comments)     hypotension     Health Maintenance   Topic Date Due    Diabetic foot exam  Never done    Diabetic retinal exam  Never done    Shingles Vaccine (2 of 3) 12/20/2014  Diabetic microalbuminuria test  11/20/2021    A1C test (Diabetic or Prediabetic)  02/18/2022    Lipid screen  03/22/2022    Potassium monitoring  10/30/2022    Creatinine monitoring  10/30/2022    Breast cancer screen  11/05/2022    Depression Monitoring  11/18/2022    Annual Wellness Visit (AWV)  11/19/2022    DTaP/Tdap/Td vaccine (2 - Td or Tdap) 01/08/2026    Colorectal Cancer Screen  06/08/2027    DEXA (modify frequency per FRAX score)  Completed    Flu vaccine  Completed    Pneumococcal 65+ years Vaccine  Completed    COVID-19 Vaccine  Completed    Hepatitis C screen  Completed    Hepatitis A vaccine  Aged Out    Hib vaccine  Aged Out    Meningococcal (ACWY) vaccine  Aged Out         Objective:     Physical Exam  Vitals reviewed. Constitutional:       Appearance: She is well-developed. HENT:      Head: Normocephalic and atraumatic. Right Ear: External ear normal.      Left Ear: External ear normal.      Nose: Nose normal.      Mouth/Throat:      Pharynx: No oropharyngeal exudate. Eyes:      General:         Right eye: No discharge. Left eye: No discharge. Conjunctiva/sclera: Conjunctivae normal.      Pupils: Pupils are equal, round, and reactive to light. Neck:      Thyroid: No thyromegaly. Comments: No carotid bruits  Cardiovascular:      Rate and Rhythm: Normal rate and regular rhythm. Heart sounds: Normal heart sounds. No murmur heard. No friction rub. No gallop. Pulmonary:      Breath sounds: Normal breath sounds. No wheezing or rales. Abdominal:      General: Bowel sounds are normal. There is no distension. Palpations: Abdomen is soft. There is no mass. Tenderness: There is no abdominal tenderness. There is no guarding or rebound. Musculoskeletal:         General: No tenderness. Normal range of motion. Lymphadenopathy:      Cervical: No cervical adenopathy. Skin:     General: Skin is warm and dry.       Coloration: Skin is not pale.      Findings: No erythema or rash. Neurological:      Mental Status: She is alert and oriented to person, place, and time. Cranial Nerves: No cranial nerve deficit. Coordination: Coordination normal.      Deep Tendon Reflexes: Reflexes normal.       /68 (Site: Left Upper Arm)   Pulse 81   Temp 98 °F (36.7 °C) (Oral)   Resp 16   Ht 5' 4.5\" (1.638 m)   Wt 168 lb 8 oz (76.4 kg)   SpO2 96%   BMI 28.48 kg/m²       Impression/Plan:  1. Type 2 diabetes mellitus with hyperglycemia, without long-term current use of insulin ([facility]    2. Primary hypertension    3. Hyperlipidemia, unspecified hyperlipidemia type    4.  Major depressive disorder, recurrent, in full remission ([facility]      Requested Prescriptions     Signed Prescriptions Disp Refills    blood glucose test strips (ONETOUCH VERIO) strip 100 each 3     Sig: USE 1 STRIP TO CHECK GLUCOSE ONCE DAILY and as needed    busPIRone (BUSPAR) 15 MG tablet 90 tablet 3     Sig: Take 1 tablet by mouth once daily    pantoprazole (PROTONIX) 40 MG tablet 90 tablet 3     Sig: Take 1 tablet by mouth once daily    DULoxetine (CYMBALTA) 60 MG extended release capsule 90 capsule 3     Sig: Take 1 capsule by mouth once daily    losartan (COZAAR) 50 MG tablet 90 tablet 3     Sig: Take 1 tablet by mouth once daily    atorvastatin (LIPITOR) 20 MG tablet 90 tablet 3     Sig: Take 1 tablet by mouth once daily    amLODIPine (NORVASC) 5 MG tablet 90 tablet 3     Sig: Take 1 tablet by mouth once daily     Orders Placed This Encounter   Procedures    Lipid Panel     Standing Status:   Future     Standing Expiration Date:   2/24/2023     Order Specific Question:   Is Patient Fasting?/# of Hours     Answer:   yes/12    Comprehensive Metabolic Panel     Standing Status:   Future     Standing Expiration Date:   2/24/2023    Hemoglobin A1C     Standing Status:   Future     Standing Expiration Date:   2/24/2023    Microalbumin / Creatinine Urine Ratio     Standing Status:   Future     Standing Expiration Date:   2/24/2023       Patient giveneducational materials - see patient instructions. Discussed use, benefit, and side effects of prescribed medications. All patient questions answered. Pt voiced understanding. Reviewed health maintenance. Patient agreedwith treatment plan. Follow up as directed. **This report has been created using voice recognition software. It may contain minor errorswhich are inherent in voice recognition technology. **       Electronically signed by Naima Lr MD on 2/24/2022 at 9:21 AM

## 2022-03-08 ENCOUNTER — NURSE ONLY (OUTPATIENT)
Dept: LAB | Age: 77
End: 2022-03-08

## 2022-03-08 DIAGNOSIS — E11.65 TYPE 2 DIABETES MELLITUS WITH HYPERGLYCEMIA, WITHOUT LONG-TERM CURRENT USE OF INSULIN (HCC): ICD-10-CM

## 2022-03-08 LAB
ALBUMIN SERPL-MCNC: 4.6 G/DL (ref 3.5–5.1)
ALP BLD-CCNC: 120 U/L (ref 38–126)
ALT SERPL-CCNC: 16 U/L (ref 11–66)
ANION GAP SERPL CALCULATED.3IONS-SCNC: 15 MEQ/L (ref 8–16)
AST SERPL-CCNC: 14 U/L (ref 5–40)
AVERAGE GLUCOSE: 171 MG/DL (ref 70–126)
BILIRUB SERPL-MCNC: 0.6 MG/DL (ref 0.3–1.2)
BUN BLDV-MCNC: 21 MG/DL (ref 7–22)
CALCIUM SERPL-MCNC: 9.6 MG/DL (ref 8.5–10.5)
CHLORIDE BLD-SCNC: 102 MEQ/L (ref 98–111)
CHOLESTEROL, TOTAL: 168 MG/DL (ref 100–199)
CO2: 26 MEQ/L (ref 23–33)
CREAT SERPL-MCNC: 0.9 MG/DL (ref 0.4–1.2)
CREATININE, URINE: 77.9 MG/DL
GFR SERPL CREATININE-BSD FRML MDRD: 61 ML/MIN/1.73M2
GLUCOSE BLD-MCNC: 115 MG/DL (ref 70–108)
HBA1C MFR BLD: 7.7 % (ref 4.4–6.4)
HDLC SERPL-MCNC: 57 MG/DL
LDL CHOLESTEROL CALCULATED: 89 MG/DL
MICROALBUMIN UR-MCNC: < 1.2 MG/DL
MICROALBUMIN/CREAT UR-RTO: 15 MG/G (ref 0–30)
POTASSIUM SERPL-SCNC: 3.8 MEQ/L (ref 3.5–5.2)
SODIUM BLD-SCNC: 143 MEQ/L (ref 135–145)
TOTAL PROTEIN: 7.1 G/DL (ref 6.1–8)
TRIGL SERPL-MCNC: 109 MG/DL (ref 0–199)

## 2022-08-18 DIAGNOSIS — E11.65 TYPE 2 DIABETES MELLITUS WITH HYPERGLYCEMIA, WITHOUT LONG-TERM CURRENT USE OF INSULIN (HCC): ICD-10-CM

## 2022-08-19 RX ORDER — LOSARTAN POTASSIUM 50 MG/1
TABLET ORAL
Qty: 90 TABLET | Refills: 1 | Status: SHIPPED | OUTPATIENT
Start: 2022-08-19

## 2022-08-19 RX ORDER — DULOXETIN HYDROCHLORIDE 60 MG/1
CAPSULE, DELAYED RELEASE ORAL
Qty: 90 CAPSULE | Refills: 1 | Status: SHIPPED | OUTPATIENT
Start: 2022-08-19

## 2022-08-19 RX ORDER — BUSPIRONE HYDROCHLORIDE 15 MG/1
TABLET ORAL
Qty: 90 TABLET | Refills: 1 | Status: SHIPPED | OUTPATIENT
Start: 2022-08-19

## 2022-08-19 RX ORDER — LANCETS 30 GAUGE
1 EACH MISCELLANEOUS DAILY
Qty: 100 EACH | Refills: 3 | Status: SHIPPED | OUTPATIENT
Start: 2022-08-19

## 2022-08-19 RX ORDER — BLOOD SUGAR DIAGNOSTIC
STRIP MISCELLANEOUS
Qty: 100 EACH | Refills: 3 | Status: SHIPPED | OUTPATIENT
Start: 2022-08-19

## 2022-08-19 RX ORDER — ATORVASTATIN CALCIUM 20 MG/1
TABLET, FILM COATED ORAL
Qty: 90 TABLET | Refills: 1 | Status: SHIPPED | OUTPATIENT
Start: 2022-08-19

## 2022-08-19 RX ORDER — PANTOPRAZOLE SODIUM 40 MG/1
TABLET, DELAYED RELEASE ORAL
Qty: 90 TABLET | Refills: 1 | Status: SHIPPED | OUTPATIENT
Start: 2022-08-19

## 2022-08-19 RX ORDER — AMLODIPINE BESYLATE 5 MG/1
TABLET ORAL
Qty: 90 TABLET | Refills: 1 | Status: SHIPPED | OUTPATIENT
Start: 2022-08-19

## 2022-08-19 RX ORDER — METFORMIN HYDROCHLORIDE 500 MG/1
1000 TABLET, EXTENDED RELEASE ORAL
Qty: 180 TABLET | Refills: 1 | Status: SHIPPED | OUTPATIENT
Start: 2022-08-19

## 2022-08-19 RX ORDER — GLIMEPIRIDE 2 MG/1
2 TABLET ORAL EVERY MORNING
Qty: 90 TABLET | Refills: 1 | Status: SHIPPED | OUTPATIENT
Start: 2022-08-19 | End: 2022-09-08

## 2022-09-08 RX ORDER — GLIMEPIRIDE 2 MG/1
TABLET ORAL
Qty: 90 TABLET | Refills: 10 | Status: SHIPPED | OUTPATIENT
Start: 2022-09-08

## 2022-11-11 ENCOUNTER — HOSPITAL ENCOUNTER (OUTPATIENT)
Dept: WOMENS IMAGING | Age: 77
Discharge: HOME | End: 2022-11-11
Payer: COMMERCIAL

## 2022-11-11 DIAGNOSIS — Z12.31 VISIT FOR SCREENING MAMMOGRAM: ICD-10-CM

## 2022-11-11 PROCEDURE — 77067 SCR MAMMO BI INCL CAD: CPT

## 2022-11-15 SDOH — HEALTH STABILITY: PHYSICAL HEALTH: ON AVERAGE, HOW MANY DAYS PER WEEK DO YOU ENGAGE IN MODERATE TO STRENUOUS EXERCISE (LIKE A BRISK WALK)?: 2 DAYS

## 2022-11-15 ASSESSMENT — PATIENT HEALTH QUESTIONNAIRE - PHQ9
SUM OF ALL RESPONSES TO PHQ QUESTIONS 1-9: 1
SUM OF ALL RESPONSES TO PHQ QUESTIONS 1-9: 1
1. LITTLE INTEREST OR PLEASURE IN DOING THINGS: 0
2. FEELING DOWN, DEPRESSED OR HOPELESS: 1
SUM OF ALL RESPONSES TO PHQ9 QUESTIONS 1 & 2: 1
SUM OF ALL RESPONSES TO PHQ QUESTIONS 1-9: 1
SUM OF ALL RESPONSES TO PHQ QUESTIONS 1-9: 1

## 2022-11-15 ASSESSMENT — LIFESTYLE VARIABLES
HOW MANY STANDARD DRINKS CONTAINING ALCOHOL DO YOU HAVE ON A TYPICAL DAY: PATIENT DOES NOT DRINK
HOW MANY STANDARD DRINKS CONTAINING ALCOHOL DO YOU HAVE ON A TYPICAL DAY: 0
HOW OFTEN DO YOU HAVE A DRINK CONTAINING ALCOHOL: 1
HOW OFTEN DO YOU HAVE SIX OR MORE DRINKS ON ONE OCCASION: 1
HOW OFTEN DO YOU HAVE A DRINK CONTAINING ALCOHOL: NEVER

## 2022-11-21 ENCOUNTER — OFFICE VISIT (OUTPATIENT)
Dept: FAMILY MEDICINE CLINIC | Age: 77
End: 2022-11-21
Payer: COMMERCIAL

## 2022-11-21 VITALS
RESPIRATION RATE: 16 BRPM | WEIGHT: 168.3 LBS | BODY MASS INDEX: 28.04 KG/M2 | HEIGHT: 65 IN | SYSTOLIC BLOOD PRESSURE: 104 MMHG | HEART RATE: 76 BPM | DIASTOLIC BLOOD PRESSURE: 62 MMHG

## 2022-11-21 DIAGNOSIS — E78.5 HYPERLIPIDEMIA, UNSPECIFIED HYPERLIPIDEMIA TYPE: ICD-10-CM

## 2022-11-21 DIAGNOSIS — H91.93 BILATERAL HEARING LOSS, UNSPECIFIED HEARING LOSS TYPE: ICD-10-CM

## 2022-11-21 DIAGNOSIS — Z00.00 ROUTINE GENERAL MEDICAL EXAMINATION AT A HEALTH CARE FACILITY: Primary | ICD-10-CM

## 2022-11-21 DIAGNOSIS — Z00.00 MEDICARE ANNUAL WELLNESS VISIT, SUBSEQUENT: ICD-10-CM

## 2022-11-21 DIAGNOSIS — I10 PRIMARY HYPERTENSION: Chronic | ICD-10-CM

## 2022-11-21 DIAGNOSIS — E11.65 TYPE 2 DIABETES MELLITUS WITH HYPERGLYCEMIA, WITHOUT LONG-TERM CURRENT USE OF INSULIN (HCC): ICD-10-CM

## 2022-11-21 DIAGNOSIS — F32.A DEPRESSION, UNSPECIFIED DEPRESSION TYPE: ICD-10-CM

## 2022-11-21 PROCEDURE — 1123F ACP DISCUSS/DSCN MKR DOCD: CPT | Performed by: FAMILY MEDICINE

## 2022-11-21 PROCEDURE — 3078F DIAST BP <80 MM HG: CPT | Performed by: FAMILY MEDICINE

## 2022-11-21 PROCEDURE — 3074F SYST BP LT 130 MM HG: CPT | Performed by: FAMILY MEDICINE

## 2022-11-21 PROCEDURE — 3051F HG A1C>EQUAL 7.0%<8.0%: CPT | Performed by: FAMILY MEDICINE

## 2022-11-21 PROCEDURE — G0439 PPPS, SUBSEQ VISIT: HCPCS | Performed by: FAMILY MEDICINE

## 2022-11-21 SDOH — ECONOMIC STABILITY: FOOD INSECURITY: WITHIN THE PAST 12 MONTHS, THE FOOD YOU BOUGHT JUST DIDN'T LAST AND YOU DIDN'T HAVE MONEY TO GET MORE.: SOMETIMES TRUE

## 2022-11-21 SDOH — ECONOMIC STABILITY: FOOD INSECURITY: WITHIN THE PAST 12 MONTHS, YOU WORRIED THAT YOUR FOOD WOULD RUN OUT BEFORE YOU GOT MONEY TO BUY MORE.: SOMETIMES TRUE

## 2022-11-21 ASSESSMENT — PATIENT HEALTH QUESTIONNAIRE - PHQ9
SUM OF ALL RESPONSES TO PHQ9 QUESTIONS 1 & 2: 2
2. FEELING DOWN, DEPRESSED OR HOPELESS: 1
SUM OF ALL RESPONSES TO PHQ QUESTIONS 1-9: 7
5. POOR APPETITE OR OVEREATING: 1
7. TROUBLE CONCENTRATING ON THINGS, SUCH AS READING THE NEWSPAPER OR WATCHING TELEVISION: 0
8. MOVING OR SPEAKING SO SLOWLY THAT OTHER PEOPLE COULD HAVE NOTICED. OR THE OPPOSITE, BEING SO FIGETY OR RESTLESS THAT YOU HAVE BEEN MOVING AROUND A LOT MORE THAN USUAL: 0
9. THOUGHTS THAT YOU WOULD BE BETTER OFF DEAD, OR OF HURTING YOURSELF: 0
SUM OF ALL RESPONSES TO PHQ QUESTIONS 1-9: 7
SUM OF ALL RESPONSES TO PHQ QUESTIONS 1-9: 7
1. LITTLE INTEREST OR PLEASURE IN DOING THINGS: 1
10. IF YOU CHECKED OFF ANY PROBLEMS, HOW DIFFICULT HAVE THESE PROBLEMS MADE IT FOR YOU TO DO YOUR WORK, TAKE CARE OF THINGS AT HOME, OR GET ALONG WITH OTHER PEOPLE: 0
3. TROUBLE FALLING OR STAYING ASLEEP: 2
4. FEELING TIRED OR HAVING LITTLE ENERGY: 2
6. FEELING BAD ABOUT YOURSELF - OR THAT YOU ARE A FAILURE OR HAVE LET YOURSELF OR YOUR FAMILY DOWN: 0
SUM OF ALL RESPONSES TO PHQ QUESTIONS 1-9: 7

## 2022-11-21 ASSESSMENT — LIFESTYLE VARIABLES
HOW MANY STANDARD DRINKS CONTAINING ALCOHOL DO YOU HAVE ON A TYPICAL DAY: PATIENT DOES NOT DRINK
HOW OFTEN DO YOU HAVE A DRINK CONTAINING ALCOHOL: NEVER

## 2022-11-21 ASSESSMENT — SOCIAL DETERMINANTS OF HEALTH (SDOH): HOW HARD IS IT FOR YOU TO PAY FOR THE VERY BASICS LIKE FOOD, HOUSING, MEDICAL CARE, AND HEATING?: HARD

## 2022-11-22 NOTE — PROGRESS NOTES
Medicare Annual Wellness Visit    Pavithra Lofton is here for Medicare AWV    Assessment & Plan   Routine general medical examination at a health care facility  -     Lipid Panel; Future  -     Comprehensive Metabolic Panel; Future  -     CBC with Auto Differential; Future  -     Hemoglobin A1C; Future  Type 2 diabetes mellitus with hyperglycemia, without long-term current use of insulin (HCC)  -     Lipid Panel; Future  -     Comprehensive Metabolic Panel; Future  -     CBC with Auto Differential; Future  -     Hemoglobin A1C; Future  Primary hypertension  -     CBC with Auto Differential; Future  Depression, unspecified depression type  Hyperlipidemia, unspecified hyperlipidemia type  Bilateral hearing loss, unspecified hearing loss type  -     [facility]    Recommendations for Preventive Services Due: see orders and patient instructions/AVS.  Recommended screening schedule for the next 5-10 years is provided to the patient in written form: see Patient Instructions/AVS.     No follow-ups on file. Subjective   The following acute and/or chronic problems were also addressed today:   Diagnosis Orders   1. Routine general medical examination at a health care facility  Lipid Panel    Comprehensive Metabolic Panel    CBC with Auto Differential    Hemoglobin A1C      2. Type 2 diabetes mellitus with hyperglycemia, without long-term current use of insulin (HCC)  Lipid Panel    Comprehensive Metabolic Panel    CBC with Auto Differential    Hemoglobin A1C      3. Primary hypertension  CBC with Auto Differential      4. Depression, unspecified depression type        5. Hyperlipidemia, unspecified hyperlipidemia type        6. Bilateral hearing loss, unspecified hearing loss type  [facility]            Patient's complete Health Risk Assessment and screening values have been reviewed and are found in Flowsheets.  The following problems were reviewed today and where indicated follow up appointments were made and/or referrals ordered. Positive Risk Factor Screenings with Interventions:      Depression:  PHQ-2 Score: 2  PHQ-9 Total Score: 7    Severity:1-4 = minimal depression, 5-9 = mild depression, 10-14 = moderate depression, 15-19 = moderately severe depression, 20-27 = severe depression  Depression Interventions:  na          General Health and ACP:  General  In general, how would you say your health is?: Good  In the past 7 days, have you experienced any of the following: New or Increased Pain, New or Increased Fatigue, Loneliness, Social Isolation, Stress or Anger?: No  Do you get the social and emotional support that you need?: Yes  Do you have a Living Will?: (!) No    Advance Directives       Power of  Living Will ACP-Advance Directive ACP-Power of     Not on File Not on File Not on File Not on File        General Health Risk Interventions:  No Living Will: Patient declines ACP discussion/assistance    Health Habits/Nutrition:  Physical Activity: Unknown    Days of Exercise per Week: 2 days    Minutes of Exercise per Session: Not on file     Have you lost any weight without trying in the past 3 months?: No  Body mass index: (!) 28.44  Have you seen the dentist within the past year?: (!) No  Health Habits/Nutrition Interventions:  na    Hearing/Vision:  Do you or your family notice any trouble with your hearing that hasn't been managed with hearing aids?: (!) Yes  Do you have difficulty driving, watching TV, or doing any of your daily activities because of your eyesight?: (!) Yes  Have you had an eye exam within the past year?: (!) No  No results found.   Hearing/Vision Interventions:  Audiology ref    Safety:  Do you have working smoke detectors?: Yes  Do you have any tripping hazards - loose or unsecured carpets or rugs?: No  Do you have any tripping hazards - clutter in doorways, halls, or stairs?: No  Do you have either shower bars, grab bars, non-slip mats or non-slip surfaces in your shower or bathtub?: (!) No  Do all of your stairways have a railing or banister?: Yes  Do you always fasten your seatbelt when you are in a car?: Yes  Safety Interventions:  Home safety tips provided           Objective   Vitals:    11/21/22 0854   BP: 104/62   Site: Right Upper Arm   Position: Sitting   Cuff Size: Medium Adult   Pulse: 76   Resp: 16   Weight: 168 lb 4.8 oz (76.3 kg)   Height: 5' 4.5\" (1.638 m)      Body mass index is 28.44 kg/m². General Appearance: alert and oriented to person, place and time, well developed and well- nourished, in no acute distress  Skin: warm and dry, no rash or erythema  Head: normocephalic and atraumatic  Eyes: pupils equal, round, and reactive to light, extraocular eye movements intact, conjunctivae normal  ENT: tympanic membrane, external ear and ear canal normal bilaterally, nose without deformity, nasal mucosa and turbinates normal without polyps  Neck: supple and non-tender without mass, no thyromegaly or thyroid nodules, no cervical lymphadenopathy  Pulmonary/Chest: clear to auscultation bilaterally- no wheezes, rales or rhonchi, normal air movement, no respiratory distress  Cardiovascular: normal rate, regular rhythm, normal S1 and S2, no murmurs, rubs, clicks, or gallops, distal pulses intact, no carotid bruits  Abdomen: soft, non-tender, non-distended, normal bowel sounds, no masses or organomegaly  Extremities: no cyanosis, clubbing or edema  Musculoskeletal: normal range of motion, no joint swelling, deformity or tenderness  Neurologic: reflexes normal and symmetric, no cranial nerve deficit, gait, coordination and speech normal       Allergies   Allergen Reactions    Lisinopril Other (See Comments)     Dry mouth    Sulfa Antibiotics Hives and Other (See Comments)     hypotension     Prior to Visit Medications    Medication Sig Taking?  Authorizing Provider   glimepiride (AMARYL) 2 MG tablet TAKE 1 TABLET BY MOUTH ONCE DAILY IN THE MORNING Yes Nanda Galarza MD   blood glucose test strips (ONETOUCH VERIO) strip USE 1 STRIP TO CHECK GLUCOSE ONCE DAILY and as needed Yes Nanda Galarza MD   metFORMIN (GLUCOPHAGE-XR) 500 MG extended release tablet Take 2 tablets by mouth daily (with breakfast) Yes Nanda Galarza MD   Lancets MISC 1 each by Does not apply route daily Yes aNnda Galarza MD   amLODIPine (NORVASC) 5 MG tablet Take 1 tablet by mouth once daily Yes Nanda Galarza MD   atorvastatin (LIPITOR) 20 MG tablet Take 1 tablet by mouth once daily Yes Nanda Galarza MD   busPIRone (BUSPAR) 15 MG tablet Take 1 tablet by mouth once daily Yes Nanda Galarza MD   empagliflozin (JARDIANCE) 25 MG tablet Take 1 tablet by mouth daily Yes Nanda Galarza MD   losartan (COZAAR) 50 MG tablet Take 1 tablet by mouth once daily Yes Nanda Galarza MD   pantoprazole (PROTONIX) 40 MG tablet Take 1 tablet by mouth once daily Yes Nanda Galarza MD   DULoxetine (CYMBALTA) 60 MG extended release capsule Take 1 capsule by mouth once daily Yes Nanda Galarza MD   Alcohol Swabs (ALCOHOL PADS) 70 % PADS Use daily Yes Nanda Galarza MD   Multiple Vitamin (MULTIVITAMIN PO) Take 2 tablets by mouth daily Joan Later  Patient not taking: Reported on 11/21/2022  Historical Provider, MD   Magnesium Chloride (MAGNESIUM DR PO) Take 250 mg by mouth daily   Patient not taking: Reported on 11/21/2022  Historical Provider, MD Black (Including outside providers/suppliers regularly involved in providing care):   Patient Care Team:  Nanda Galarza MD as PCP - General (Family Medicine)  Nanda Galarza MD [facility] PCP [facility]     Reviewed and updated this visit:  Tobacco  Allergies  Meds  Problems  Med Hx  Surg Hx  Soc Hx  Fam Hx        Seen today for wellness visit. Discussed the importance of a healthy life style. Balanced diet, nutrition, physical activity,and injury prevention.   Also discussed the importance of up to date immunizations and annual screenings.

## 2022-11-22 NOTE — PATIENT INSTRUCTIONS
Personalized Preventive Plan for Manuel Aelx - 11/21/2022  Medicare offers a range of preventive health benefits. Some of the tests and screenings are paid in full while other may be subject to a deductible, co-insurance, and/or copay. Some of these benefits include a comprehensive review of your medical history including lifestyle, illnesses that may run in your family, and various assessments and screenings as appropriate. After reviewing your medical record and screening and assessments performed today your provider may have ordered immunizations, labs, imaging, and/or referrals for you. A list of these orders (if applicable) as well as your Preventive Care list are included within your After Visit Summary for your review. Other Preventive Recommendations:    A preventive eye exam performed by an eye specialist is recommended every 1-2 years to screen for glaucoma; cataracts, macular degeneration, and other eye disorders. A preventive dental visit is recommended every 6 months. Try to get at least 150 minutes of exercise per week or 10,000 steps per day on a pedometer . Order or download the FREE \"Exercise & Physical Activity: Your Everyday Guide\" from [employer]. Call 3-680.706.2861 or search [employer] online. You need 4683-9833 mg of calcium and 1932-0972 IU of vitamin D per day. It is possible to meet your calcium requirement with diet alone, but a vitamin D supplement is usually necessary to meet this goal.  When exposed to the sun, use a sunscreen that protects against both UVA and UVB radiation with an SPF of 30 or greater. Reapply every 2 to 3 hours or after sweating, drying off with a towel, or swimming. Always wear a seat belt when traveling in a car. Always wear a helmet when riding a bicycle or motorcycle.

## 2022-12-07 ENCOUNTER — NURSE ONLY (OUTPATIENT)
Dept: LAB | Age: 77
End: 2022-12-07

## 2022-12-07 DIAGNOSIS — E11.65 TYPE 2 DIABETES MELLITUS WITH HYPERGLYCEMIA, WITHOUT LONG-TERM CURRENT USE OF INSULIN (HCC): ICD-10-CM

## 2022-12-07 DIAGNOSIS — Z00.00 ROUTINE GENERAL MEDICAL EXAMINATION AT A HEALTH CARE FACILITY: ICD-10-CM

## 2022-12-07 DIAGNOSIS — I10 PRIMARY HYPERTENSION: Chronic | ICD-10-CM

## 2022-12-07 LAB
ALBUMIN SERPL-MCNC: 4.2 G/DL (ref 3.5–5.1)
ALP BLD-CCNC: 104 U/L (ref 38–126)
ALT SERPL-CCNC: 15 U/L (ref 11–66)
ANION GAP SERPL CALCULATED.3IONS-SCNC: 12 MEQ/L (ref 8–16)
AST SERPL-CCNC: 15 U/L (ref 5–40)
AVERAGE GLUCOSE: 192 MG/DL (ref 70–126)
BASOPHILS # BLD: 1.4 %
BASOPHILS ABSOLUTE: 0.1 THOU/MM3 (ref 0–0.1)
BILIRUB SERPL-MCNC: 0.7 MG/DL (ref 0.3–1.2)
BUN BLDV-MCNC: 18 MG/DL (ref 7–22)
CALCIUM SERPL-MCNC: 9.3 MG/DL (ref 8.5–10.5)
CHLORIDE BLD-SCNC: 102 MEQ/L (ref 98–111)
CHOLESTEROL, TOTAL: 181 MG/DL (ref 100–199)
CO2: 27 MEQ/L (ref 23–33)
CREAT SERPL-MCNC: 0.8 MG/DL (ref 0.4–1.2)
EOSINOPHIL # BLD: 3.4 %
EOSINOPHILS ABSOLUTE: 0.2 THOU/MM3 (ref 0–0.4)
ERYTHROCYTE [DISTWIDTH] IN BLOOD BY AUTOMATED COUNT: 13.1 % (ref 11.5–14.5)
ERYTHROCYTE [DISTWIDTH] IN BLOOD BY AUTOMATED COUNT: 44 FL (ref 35–45)
GFR SERPL CREATININE-BSD FRML MDRD: > 60 ML/MIN/1.73M2
GLUCOSE BLD-MCNC: 172 MG/DL (ref 70–108)
HBA1C MFR BLD: 8.4 % (ref 4.4–6.4)
HCT VFR BLD CALC: 51.6 % (ref 37–47)
HDLC SERPL-MCNC: 51 MG/DL
HEMOGLOBIN: 16.8 GM/DL (ref 12–16)
IMMATURE GRANS (ABS): 0.01 THOU/MM3 (ref 0–0.07)
IMMATURE GRANULOCYTES: 0.2 %
LDL CHOLESTEROL CALCULATED: 106 MG/DL
LYMPHOCYTES # BLD: 22.3 %
LYMPHOCYTES ABSOLUTE: 1.3 THOU/MM3 (ref 1–4.8)
MCH RBC QN AUTO: 29.8 PG (ref 26–33)
MCHC RBC AUTO-ENTMCNC: 32.6 GM/DL (ref 32.2–35.5)
MCV RBC AUTO: 91.5 FL (ref 81–99)
MONOCYTES # BLD: 4.6 %
MONOCYTES ABSOLUTE: 0.3 THOU/MM3 (ref 0.4–1.3)
NUCLEATED RED BLOOD CELLS: 0 /100 WBC
PLATELET # BLD: 197 THOU/MM3 (ref 130–400)
PMV BLD AUTO: 13.2 FL (ref 9.4–12.4)
POTASSIUM SERPL-SCNC: 4.7 MEQ/L (ref 3.5–5.2)
RBC # BLD: 5.64 MILL/MM3 (ref 4.2–5.4)
SEG NEUTROPHILS: 68.1 %
SEGMENTED NEUTROPHILS ABSOLUTE COUNT: 3.9 THOU/MM3 (ref 1.8–7.7)
SODIUM BLD-SCNC: 141 MEQ/L (ref 135–145)
TOTAL PROTEIN: 7.1 G/DL (ref 6.1–8)
TRIGL SERPL-MCNC: 119 MG/DL (ref 0–199)
WBC # BLD: 5.8 THOU/MM3 (ref 4.8–10.8)

## 2023-04-04 RX ORDER — ATORVASTATIN CALCIUM 20 MG/1
TABLET, FILM COATED ORAL
Qty: 90 TABLET | Refills: 1 | Status: SHIPPED | OUTPATIENT
Start: 2023-04-04

## 2023-04-19 ENCOUNTER — TELEPHONE (OUTPATIENT)
Dept: FAMILY MEDICINE CLINIC | Age: 78
End: 2023-04-19

## 2023-04-19 DIAGNOSIS — E11.65 TYPE 2 DIABETES MELLITUS WITH HYPERGLYCEMIA, WITHOUT LONG-TERM CURRENT USE OF INSULIN (HCC): Primary | ICD-10-CM

## 2023-04-19 NOTE — TELEPHONE ENCOUNTER
----- Message from Mikaela Laguerre MD sent at 4/18/2023  9:54 AM EDT -----  A1c up to 8.4.  Repeat cmp, a1c in June with f/u appt

## 2023-06-19 ENCOUNTER — OFFICE VISIT (OUTPATIENT)
Dept: FAMILY MEDICINE CLINIC | Age: 78
End: 2023-06-19
Payer: MEDICARE

## 2023-06-19 VITALS
HEART RATE: 68 BPM | HEIGHT: 64 IN | DIASTOLIC BLOOD PRESSURE: 70 MMHG | SYSTOLIC BLOOD PRESSURE: 130 MMHG | RESPIRATION RATE: 14 BRPM | WEIGHT: 157 LBS | BODY MASS INDEX: 26.8 KG/M2

## 2023-06-19 DIAGNOSIS — E11.65 TYPE 2 DIABETES MELLITUS WITH HYPERGLYCEMIA, WITHOUT LONG-TERM CURRENT USE OF INSULIN (HCC): Primary | ICD-10-CM

## 2023-06-19 DIAGNOSIS — E78.5 HYPERLIPIDEMIA, UNSPECIFIED HYPERLIPIDEMIA TYPE: ICD-10-CM

## 2023-06-19 DIAGNOSIS — F33.42 MAJOR DEPRESSIVE DISORDER, RECURRENT, IN FULL REMISSION (HCC): ICD-10-CM

## 2023-06-19 PROCEDURE — 3074F SYST BP LT 130 MM HG: CPT | Performed by: FAMILY MEDICINE

## 2023-06-19 PROCEDURE — 99214 OFFICE O/P EST MOD 30 MIN: CPT | Performed by: FAMILY MEDICINE

## 2023-06-19 PROCEDURE — 3078F DIAST BP <80 MM HG: CPT | Performed by: FAMILY MEDICINE

## 2023-06-19 PROCEDURE — 3046F HEMOGLOBIN A1C LEVEL >9.0%: CPT | Performed by: FAMILY MEDICINE

## 2023-06-19 PROCEDURE — 1123F ACP DISCUSS/DSCN MKR DOCD: CPT | Performed by: FAMILY MEDICINE

## 2023-06-19 RX ORDER — GLIMEPIRIDE 4 MG/1
4 TABLET ORAL 2 TIMES DAILY WITH MEALS
Qty: 180 TABLET | Refills: 3 | Status: SHIPPED | OUTPATIENT
Start: 2023-06-19

## 2023-06-19 RX ORDER — BLOOD-GLUCOSE METER
1 KIT MISCELLANEOUS DAILY
Qty: 1 KIT | Refills: 0 | Status: SHIPPED | OUTPATIENT
Start: 2023-06-19

## 2023-06-19 SDOH — ECONOMIC STABILITY: HOUSING INSECURITY
IN THE LAST 12 MONTHS, WAS THERE A TIME WHEN YOU DID NOT HAVE A STEADY PLACE TO SLEEP OR SLEPT IN A SHELTER (INCLUDING NOW)?: YES

## 2023-06-19 SDOH — ECONOMIC STABILITY: FOOD INSECURITY: WITHIN THE PAST 12 MONTHS, YOU WORRIED THAT YOUR FOOD WOULD RUN OUT BEFORE YOU GOT MONEY TO BUY MORE.: SOMETIMES TRUE

## 2023-06-19 SDOH — ECONOMIC STABILITY: FOOD INSECURITY: WITHIN THE PAST 12 MONTHS, THE FOOD YOU BOUGHT JUST DIDN'T LAST AND YOU DIDN'T HAVE MONEY TO GET MORE.: SOMETIMES TRUE

## 2023-06-19 ASSESSMENT — PATIENT HEALTH QUESTIONNAIRE - PHQ9
9. THOUGHTS THAT YOU WOULD BE BETTER OFF DEAD, OR OF HURTING YOURSELF: 0
10. IF YOU CHECKED OFF ANY PROBLEMS, HOW DIFFICULT HAVE THESE PROBLEMS MADE IT FOR YOU TO DO YOUR WORK, TAKE CARE OF THINGS AT HOME, OR GET ALONG WITH OTHER PEOPLE: 1
7. TROUBLE CONCENTRATING ON THINGS, SUCH AS READING THE NEWSPAPER OR WATCHING TELEVISION: 0
8. MOVING OR SPEAKING SO SLOWLY THAT OTHER PEOPLE COULD HAVE NOTICED. OR THE OPPOSITE, BEING SO FIGETY OR RESTLESS THAT YOU HAVE BEEN MOVING AROUND A LOT MORE THAN USUAL: 0
SUM OF ALL RESPONSES TO PHQ9 QUESTIONS 1 & 2: 1
SUM OF ALL RESPONSES TO PHQ QUESTIONS 1-9: 5
SUM OF ALL RESPONSES TO PHQ QUESTIONS 1-9: 5
4. FEELING TIRED OR HAVING LITTLE ENERGY: 1
6. FEELING BAD ABOUT YOURSELF - OR THAT YOU ARE A FAILURE OR HAVE LET YOURSELF OR YOUR FAMILY DOWN: 0
5. POOR APPETITE OR OVEREATING: 1
2. FEELING DOWN, DEPRESSED OR HOPELESS: 0
SUM OF ALL RESPONSES TO PHQ QUESTIONS 1-9: 5
1. LITTLE INTEREST OR PLEASURE IN DOING THINGS: 1
SUM OF ALL RESPONSES TO PHQ QUESTIONS 1-9: 5
3. TROUBLE FALLING OR STAYING ASLEEP: 2

## 2023-06-20 ENCOUNTER — CARE COORDINATION (OUTPATIENT)
Dept: CARE COORDINATION | Age: 78
End: 2023-06-20

## 2023-06-20 ASSESSMENT — ENCOUNTER SYMPTOMS
DIARRHEA: 0
ABDOMINAL PAIN: 0
VOMITING: 0
BACK PAIN: 0
CHEST TIGHTNESS: 0
SORE THROAT: 0
RHINORRHEA: 0
COUGH: 0
NAUSEA: 0
EYES NEGATIVE: 1
SHORTNESS OF BREATH: 0

## 2023-06-20 NOTE — CARE COORDINATION
Adequate participation with social networks   How would you rate their financial resources (including ability to afford all required medical care)?: Financially insecure, some resource challenges   How wells does the patient now understand their health and well-being (symptoms, signs or risk factors) and what they need to do to manage their health?: Reasonable to good understanding and already engages in managing health or is willing to undertake better management   How well do you think your patient can engage in healthcare discussions? (Barriers include language, deafness, aphasia, alcohol or drug problems, learning difficulties, concentration): Clear and open communication, no identified barriers   Do other services need to be involved to help this patient?: Other care/services in place and adequate   Are current services involved with this patient well-coordinated? (Include coordination with other services you are now recommendation): Required care/services in place and adequately coordinated   Suggested Interventions and Community Resources  Diabetes Education: In Process    Social Work: Completed   Transportation Services: Declined   Zone Management Tools: In Process         Schedule an appointment with the patient's PCP, Set up/Review an Education Plan, Set up/Review Goals              Future Appointments   Date Time Provider Usman Franklin   9/19/2023  8:45 AM MD TRINO Tavera [facility]   11/13/2023 11:00 AM STR MAMMOGRAPHY RM2  [facility] Radiolog   11/22/2023  9:00 AM MD TRINO Tavera [facility]      and   Diabetes Assessment      Meal Planning: Avoidance of concentrated sweets   How often do you test your blood sugar?: Daily   Do you have barriers with adherence to non-pharmacologic self-management interventions?  (Nutrition/Exercise/Self-Monitoring): No   Have you ever had to go to the ED for symptoms of low blood sugar?: No       No patient-reported symptoms

## 2023-06-22 ENCOUNTER — CARE COORDINATION (OUTPATIENT)
Dept: CARE COORDINATION | Age: 78
End: 2023-06-22

## 2023-06-27 ENCOUNTER — CARE COORDINATION (OUTPATIENT)
Dept: CARE COORDINATION | Age: 78
End: 2023-06-27

## 2023-06-29 DIAGNOSIS — E11.65 TYPE 2 DIABETES MELLITUS WITH HYPERGLYCEMIA, WITHOUT LONG-TERM CURRENT USE OF INSULIN (HCC): ICD-10-CM

## 2023-06-29 RX ORDER — BLOOD SUGAR DIAGNOSTIC
STRIP MISCELLANEOUS
Qty: 100 EACH | Refills: 3 | Status: SHIPPED | OUTPATIENT
Start: 2023-06-29

## 2023-07-05 ENCOUNTER — CARE COORDINATION (OUTPATIENT)
Dept: CARE COORDINATION | Age: 78
End: 2023-07-05

## 2023-07-05 NOTE — CARE COORDINATION
Ambulatory Care Coordination Note  2023    Patient Current Location:  Home: [address] [place] [id number]     [facility], [address] [facility] contacted the patient by telephone. Verified name and  with patient as identifiers. Provided introduction to self, and explanation of the MI DE PAZ role. Challenges to be reviewed by the provider   Additional needs identified to be addressed with provider: No  none               Method of communication with provider: none. I spoke with the patient for continued Care Coordination follow up and education. Patient states she is doing fine. Patient is unable to check FBS because she is unsure how to work glucometer. Patient states she is going to call PCP office and ask about stopping by to be shown in person. Patient denied any symptoms of hypo or hyperglycemia. Diabetic diet and importance of diet adherence reviewed with patient. Patient was also encouraged to work to remain active and reviewed how this also helps with BS control. I advised patient to contact PCP office if needed. No further needs at this time. Lab Results       None            Care Coordination Interventions    Referral from Primary Care Provider: No  Suggested Interventions and Community Resources  Diabetes Education: In Process  Social Work: Completed (Comment: food insecurity)  Transportation Support: Declined  Zone Management Tools:  In Process          Goals Addressed    None         Future Appointments   Date Time Provider [address]   2023  8:45 AM Rayford Kawasaki, MD [facility] [place]   2023 11:00 AM STR MAMMOGRAPHY RM2  Keily Jane [facility] Radiolog   2023  9:00 AM Rayford Kawasaki, MD [facility] [place]

## 2023-07-20 ENCOUNTER — CARE COORDINATION (OUTPATIENT)
Dept: CARE COORDINATION | Age: 78
End: 2023-07-20

## 2023-07-20 NOTE — CARE COORDINATION
Ambulatory Care Coordination Note  2023    Patient Current Location:  Home: [address] [place] [phone]     ACM contacted the patient by telephone. Verified name and  with patient as identifiers. Provided introduction to self, and explanation of the ACM role. Challenges to be reviewed by the provider   Additional needs identified to be addressed with provider: No  none               Method of communication with provider: none. ACM: Louann Franco RN    Most are FBS but some are 2 hours after breakfast.   194, 230, 246, 194, 268, 140, 189, 255, 243, 216, 237, 186  Hasn't been walking   Advised on daily exercise after largest meal of day to help lower BS. Making homemade pizzas often  Agreed to dietician referral    Car is fixed. Has family in town and good neighbors. Plan -   Dietician referral to [facility] checking FBS daily  Food resources -   Diet education for diabetes  Walk daily   Early symptom recognition and reporting to prevent ED and admissions  Use same or next day appts at PCP office for non-emergency problems  RPM for DM and HTN? Offered patient enrollment in the Remote Patient Monitoring (RPM) program for in-home monitoring: Yes, but did not enroll at this time. Lab Results       None            Care Coordination Interventions    Referral from Primary Care Provider: No  Suggested Interventions and Community Resources  Diabetes Education: In Process  Registered Dietician: Completed  Social Work: Completed (Comment: food insecurity)  Transportation Support: Declined  Zone Management Tools: In Process          Goals Addressed                   This Visit's Progress     Conditions and Symptoms   On track     I will schedule office visits, as directed by my provider. I will keep my appointment or reschedule if I have to cancel. I will notify my provider of any barriers to my plan of care.   I will follow my Zone Management tool to seek urgent or emergent

## 2023-07-21 ENCOUNTER — CARE COORDINATION (OUTPATIENT)
Dept: CARE COORDINATION | Age: 78
End: 2023-07-21

## 2023-07-21 NOTE — CARE COORDINATION
RD received referral from Ever Lopez:    Patient is being referred to central dietitian for the following reason(s) and diagnoses: Diabetes and HTN     Preferred time to reach patient (if specified): anytime     Information provided (if any): Most are FBS but some are 2 hours after breakfast.   194, 230, 246, 194, 268, 140, 189, 255, 243, 216, 237, 186   Hasn't been walking   Advised on daily exercise after largest meal of day to help lower BS. Making homemade pizzas often   Would benefit from healthy meal planning on a budget   Social work mailed food resources/food mckeon     RD contacted Patricio Forrester regarding Dietitian referral. Pt answered, RD explained reason for call and role in care. Pt requested RD call back on a different day. Patient prefers a call on Monday 7/24/23 in the morning. RD will contact patient as requested and follow up as appropriate.        [address], LD  [phone]

## 2023-07-24 ENCOUNTER — CARE COORDINATION (OUTPATIENT)
Dept: CARE COORDINATION | Age: 78
End: 2023-07-24

## 2023-07-24 NOTE — CARE COORDINATION
Chio Dowd  July 24, 2023    Initial Referral Reason: Diabetes and HTN    Patient Care Team:  Demetri Treviño MD as PCP - General (Family Medicine)  [facility]  George Rubio RN as Ambulatory Care Manager  Susy Matthews RD, LD as Dietitian (Dietitian Registered)    Past Medical History:    Current Outpatient Medications   Medication Sig Dispense Refill    ONETOUCH VERIO strip USE 1 STRIP TO CHECK GLUCOSE ONCE DAILY AND AS NEEDED 100 each 3    glucose monitoring (FREESTYLE FREEDOM) kit 1 kit by Does not apply route daily ONE TOUCH VERIO FLEX 1 kit 0    glimepiride (AMARYL) 4 MG tablet Take 1 tablet by mouth with breakfast and with evening meal 180 tablet 3    atorvastatin (LIPITOR) 20 MG tablet Take 1 tablet by mouth once daily 90 tablet 1    metFORMIN (GLUCOPHAGE-XR) 500 MG extended release tablet Take 2 tablets by mouth daily (with breakfast) 180 tablet 1    Lancets MISC 1 each by Does not apply route daily 100 each 3    amLODIPine (NORVASC) 5 MG tablet Take 1 tablet by mouth once daily 90 tablet 1    busPIRone (BUSPAR) 15 MG tablet Take 1 tablet by mouth once daily 90 tablet 1    empagliflozin (JARDIANCE) 25 MG tablet Take 1 tablet by mouth daily 90 tablet 1    losartan (COZAAR) 50 MG tablet Take 1 tablet by mouth once daily 90 tablet 1    pantoprazole (PROTONIX) 40 MG tablet Take 1 tablet by mouth once daily 90 tablet 1    DULoxetine (CYMBALTA) 60 MG extended release capsule Take 1 capsule by mouth once daily 90 capsule 1    Alcohol Swabs (ALCOHOL PADS) 70 % PADS Use daily 100 each 3     No current facility-administered medications for this visit.        Biochemical Data, Medical Tests and Procedures:    Lab Results   Component Value Date    LABA1C 11.4 (H) 06/13/2023     No results found for: EAG    Lab Results   Component Value Date    CHOL 181 12/07/2022    CHOL 168 03/08/2022    CHOL 190 03/22/2021     Lab Results   Component Value Date    TRIG 119

## 2023-07-28 ENCOUNTER — CARE COORDINATION (OUTPATIENT)
Dept: CARE COORDINATION | Age: 78
End: 2023-07-28

## 2023-07-28 NOTE — CARE COORDINATION
Per Alia Owens RDN dietician education mailed to patient.           [facility] Specialist  Medication Assistance  [address] [employer]    (B) [phone]  (W) [phone]

## 2023-08-02 ENCOUNTER — CARE COORDINATION (OUTPATIENT)
Dept: CARE COORDINATION | Age: 78
End: 2023-08-02

## 2023-08-02 DIAGNOSIS — E11.65 TYPE 2 DIABETES MELLITUS WITH HYPERGLYCEMIA, WITHOUT LONG-TERM CURRENT USE OF INSULIN (HCC): ICD-10-CM

## 2023-08-03 RX ORDER — AMLODIPINE BESYLATE 5 MG/1
TABLET ORAL
Qty: 90 TABLET | Refills: 1 | Status: SHIPPED | OUTPATIENT
Start: 2023-08-03

## 2023-08-03 RX ORDER — BUSPIRONE HYDROCHLORIDE 15 MG/1
TABLET ORAL
Qty: 90 TABLET | Refills: 1 | Status: SHIPPED | OUTPATIENT
Start: 2023-08-03

## 2023-08-03 RX ORDER — METFORMIN HYDROCHLORIDE 500 MG/1
1000 TABLET, EXTENDED RELEASE ORAL
Qty: 180 TABLET | Refills: 1 | Status: SHIPPED | OUTPATIENT
Start: 2023-08-03

## 2023-08-03 RX ORDER — LOSARTAN POTASSIUM 50 MG/1
TABLET ORAL
Qty: 90 TABLET | Refills: 1 | Status: SHIPPED | OUTPATIENT
Start: 2023-08-03

## 2023-08-03 RX ORDER — DULOXETIN HYDROCHLORIDE 60 MG/1
CAPSULE, DELAYED RELEASE ORAL
Qty: 90 CAPSULE | Refills: 1 | Status: SHIPPED | OUTPATIENT
Start: 2023-08-03

## 2023-08-03 RX ORDER — PANTOPRAZOLE SODIUM 40 MG/1
TABLET, DELAYED RELEASE ORAL
Qty: 90 TABLET | Refills: 1 | Status: SHIPPED | OUTPATIENT
Start: 2023-08-03

## 2023-08-14 ENCOUNTER — CARE COORDINATION (OUTPATIENT)
Dept: CARE COORDINATION | Age: 78
End: 2023-08-14

## 2023-08-14 ENCOUNTER — TELEPHONE (OUTPATIENT)
Dept: FAMILY MEDICINE CLINIC | Age: 78
End: 2023-08-14

## 2023-08-14 DIAGNOSIS — I10 PRIMARY HYPERTENSION: Primary | Chronic | ICD-10-CM

## 2023-08-14 DIAGNOSIS — E11.65 TYPE 2 DIABETES MELLITUS WITH HYPERGLYCEMIA, WITHOUT LONG-TERM CURRENT USE OF INSULIN (HCC): ICD-10-CM

## 2023-08-14 NOTE — CARE COORDINATION
[x] Determined BP cuff size: regular (9.05\"-15.74\")      [x] Determined weight scale: regular (<330lbs)                                                [x] Hours of ACM monitoring - Monday-Friday [id number]                     All questions about RPM program answered at this time. .    Lab Results       None            Care Coordination Interventions    Referral from Primary Care Provider: No  Suggested Interventions and Community Resources  Diabetes Education: In Process  Registered Dietician: Completed  Social Work: Completed (Comment: food insecurity)  Transportation Support: Declined  Zone Management Tools: In Process          Goals Addressed                   This Visit's Progress     Conditions and Symptoms   On track     I will schedule office visits, as directed by my provider. I will keep my appointment or reschedule if I have to cancel. I will notify my provider of any barriers to my plan of care. I will follow my Zone Management tool to seek urgent or emergent care. I will notify my provider of any symptoms that indicate a worsening of my condition. Barriers: impairment:  mental health: depression, financial, lack of support, and overwhelmed by complexity of regimen  Plan for overcoming my barriers: care coordination  Confidence: 9/10  Anticipated Goal Completion Date: 9/20/2023                Future Appointments   Date Time Provider [address]   9/19/2023  8:45 AM MD TRINO Joe   11/13/2023 11:00 AM STR MAMMOGRAPHY RM2  [facility] STR Radiolog   11/22/2023  9:00 AM MD TRINO Joe    and   Diabetes Assessment      Meal Planning: Avoidance of concentrated sweets   How often do you test your blood sugar?: Daily   Do you have barriers with adherence to non-pharmacologic self-management interventions?  (Nutrition/Exercise/Self-Monitoring): No   Have you ever had to go to the ED for symptoms of low blood sugar?: No       No

## 2023-08-14 NOTE — PROGRESS NOTES
Remote Patient Monitoring Treatment Plan    Received request from ACM/YA Larson RN  to order remote patient monitoring for in home monitoring of Diabetes and HTN and order completed. Patient will be monitoring blood pressure   glucose  pulse ox   weight. Please set alert for ONLY weight gain of 5# in 7 days. Pt has no documented hx of HF. Patient will engage in Remote Patient Monitoring each day to develop the skills necessary for self management. RPM Care Team Responsibilities:   Alerts will be reviewed daily and addressed within 2-4 hours during operational hours (Monday -Friday 9 am-4 pm)  Alert response and intervention documented in patient medical record  Alert response escalated to PCP per protocol and documented in patient medical record  Patient monitored over approximately  days  Discharge from program based on self-management readiness    See care coordination encounters for additional details.

## 2023-08-14 NOTE — TELEPHONE ENCOUNTER
Patient called asking if she can receive a letter to excuse her from [address] Duty due to her Anxiety.

## 2023-08-14 NOTE — CARE COORDINATION
Remote Patient Kit Ordering Note      Date/Time:  8/14/2023 3:20 PM      [x] CCSS confirmed patient shipping address  [x] Patient will receive package over the next 1-3 business days. Someone 21 years or older must be present to sign for UPS delivery. [x] HRS will contact patient within 24 hours, an [address] [place] [address] will call the patient directly: If the patient does not answer, HRS will follow up with the clinical team notifying them about the unsuccessful attempt to contact the patient. HRS will make three call attempts to the patient. Provide patient with [facility] Virtual install number is: [phone]. [x] LPN will contact patient once equipment is active to welcome them to the program.                                                         [x] Hours of RPM monitoring - Monday-Friday [phone]; encourage patient to get vitals entered by [facility] each day to have the alert addressed same day. [x]CCSS mailed RPM Patient flyer to patient. All questions answered at this time. LPN made aware the RPM kit has been ordered. EMTP notified patient of RPM equipment order.

## 2023-08-14 NOTE — CARE COORDINATION
Contacted patient regarding Dietitian follow up. RD unable to leave VM with call back number- patient's VM is full and not accepting messages at this time. RD will outreach within one week and will follow up as appropriate.      [address],   [phone]

## 2023-08-16 ENCOUNTER — CARE COORDINATION (OUTPATIENT)
Dept: CARE COORDINATION | Age: 78
End: 2023-08-16

## 2023-08-16 NOTE — CARE COORDINATION
Contacted patient regarding Dietitian follow up. RD unable to leave VM with call back number- patient's VM is full and not accepting messages at this time. RD spoke with patient for initial nutrition assessment on 7/24/23. RD outreached 8/14/23 and today 8/16/23- unable to reach patient. RD will notify ACM. RD will follow/assist with patient return call.        Kevin Ellis,   [phone]

## 2023-08-22 ENCOUNTER — CARE COORDINATION (OUTPATIENT)
Dept: CARE COORDINATION | Age: 78
End: 2023-08-22

## 2023-08-22 NOTE — CARE COORDINATION
Remote Patient Monitoring Welcome Note Date/Time: 2023 10:19 AM Patient Current Location: Home: [address] [place] [id number] Verified patients name and  as identifiers. Completed and confirmed the following: Emergency Contact: Ni Villar [x] Patient received all RPM equipment (tablet, scale, blood pressure device and cuff, and pulse oximeter)  Cuff Size: regular (9.05\"-15.74\")  Weight Scale: regular (<330lbs) [x] Instructed patient keep box for use when returning equipment [x] Reviewed Patient Welcome Letter with patient [x] Reviewed expectations for patient and care team  Monitoring hours M-F 9-4pm  Completing monitoring by 12pm on  so that alerts can be responded to in the same day  Patient weighs self at same time every day (or after urinating and waking up)  Take blood pressure 1-2 hrs after medications  RPM team may have different phone area code (including [facility], [place], [facility] or [facility])                        [x] Instructed patient to keep scale on flat surface [x] Instructed patient to keep tablet plugged in at all times [x] Instructed how to contact IT support ([phone]) [x] Provided Remote Patient Monitoring care  information All questions answered at this time. ---- Current Patient Metrics ---- Blood Pressure: 137/85, 72bpm Glucose: 157mg/dl Pulseox: 96%, 70bpm Survey: C Weight: 150.8lbs Note Created at: 2023 09:06 AM ET ---- Time-Spent: 10 minutes 0 seconds    Ambulatory Care Coordination Note  2023    Patient Current Location:  Home: [address] [place] [phone]     ACM contacted the patient by telephone. Verified name and  with patient as identifiers. Provided introduction to self, and explanation of the ACM role. Challenges to be reviewed by the provider   Additional needs identified to be addressed with provider: No  none               Method of communication with provider: none. ACM: Julio Sanderson, RADHA    Active with RPM  .

## 2023-09-11 ENCOUNTER — CARE COORDINATION (OUTPATIENT)
Dept: CARE COORDINATION | Age: 78
End: 2023-09-11

## 2023-09-11 NOTE — CARE COORDINATION
Attempted care management follow up. No answer and VM full.   Plan -   Dietician referral to [facility]  FBS daily  Food resources -   Diet education for diabetes  Walk daily   Early symptom recognition and reporting to prevent ED and admissions  Use same or next day appts at PCP office for non-emergency problems  RPM for DM and HTN enrolled

## 2023-09-12 ENCOUNTER — NURSE ONLY (OUTPATIENT)
Dept: LAB | Age: 78
End: 2023-09-12

## 2023-09-12 DIAGNOSIS — E11.65 TYPE 2 DIABETES MELLITUS WITH HYPERGLYCEMIA, WITHOUT LONG-TERM CURRENT USE OF INSULIN (HCC): ICD-10-CM

## 2023-09-12 DIAGNOSIS — E78.5 HYPERLIPIDEMIA, UNSPECIFIED HYPERLIPIDEMIA TYPE: ICD-10-CM

## 2023-09-12 LAB
ALBUMIN SERPL BCG-MCNC: 4.3 G/DL (ref 3.5–5.1)
ALP SERPL-CCNC: 116 U/L (ref 38–126)
ALT SERPL W/O P-5'-P-CCNC: 20 U/L (ref 11–66)
ANION GAP SERPL CALC-SCNC: 14 MEQ/L (ref 8–16)
AST SERPL-CCNC: 18 U/L (ref 5–40)
BILIRUB SERPL-MCNC: 0.6 MG/DL (ref 0.3–1.2)
BUN SERPL-MCNC: 25 MG/DL (ref 7–22)
CALCIUM SERPL-MCNC: 9.6 MG/DL (ref 8.5–10.5)
CHLORIDE SERPL-SCNC: 101 MEQ/L (ref 98–111)
CHOLEST SERPL-MCNC: 175 MG/DL (ref 100–199)
CO2 SERPL-SCNC: 25 MEQ/L (ref 23–33)
CREAT SERPL-MCNC: 0.9 MG/DL (ref 0.4–1.2)
CREAT UR-MCNC: 89 MG/DL
DEPRECATED MEAN GLUCOSE BLD GHB EST-ACNC: 252 MG/DL (ref 70–126)
GFR SERPL CREATININE-BSD FRML MDRD: > 60 ML/MIN/1.73M2
GLUCOSE SERPL-MCNC: 179 MG/DL (ref 70–108)
HBA1C MFR BLD HPLC: 10.4 % (ref 4.4–6.4)
HDLC SERPL-MCNC: 55 MG/DL
LDLC SERPL CALC-MCNC: 103 MG/DL
MICROALBUMIN UR-MCNC: 3.7 MG/DL
MICROALBUMIN/CREAT RATIO PNL UR: 42 MG/G (ref 0–30)
POTASSIUM SERPL-SCNC: 4.3 MEQ/L (ref 3.5–5.2)
PROT SERPL-MCNC: 7.2 G/DL (ref 6.1–8)
SODIUM SERPL-SCNC: 140 MEQ/L (ref 135–145)
TRIGL SERPL-MCNC: 86 MG/DL (ref 0–199)

## 2023-09-18 ENCOUNTER — CARE COORDINATION (OUTPATIENT)
Dept: CARE COORDINATION | Age: 78
End: 2023-09-18

## 2023-09-18 NOTE — CARE COORDINATION
Ambulatory Care Coordination Note  2023    Patient Current Location:  Home: [address] [phone]     ACM contacted the patient by telephone. Verified name and  with patient as identifiers. Provided introduction to self, and explanation of the ACM role. Challenges to be reviewed by the provider   Additional needs identified to be addressed with provider: No  none               Method of communication with provider: none. ACM: Nella Sharma RN    Most days are in 200's. Reviewed medications. Good medication adherence. PCP started Trulicity. Copay $10. She is starting today. Advised on plate method and meal planning. Advised on walking or exercise after largest meal of the day. Dietician involved. Plan -    PCP  10/4 CT   Dietician referral to [facility]  [facility] daily  Food resources -   Diet education for diabetes  Walk daily   Early symptom recognition and reporting to prevent ED and admissions  Use same or next day appts at PCP office for non-emergency problems  RPM for DM and HTN enrolled    Offered patient enrollment in the Remote Patient Monitoring (RPM) program for in-home monitoring: Yes, patient already enrolled. Lab Results       None                 Goals Addressed                   This Visit's Progress     Conditions and Symptoms   On track     I will schedule office visits, as directed by my provider. I will keep my appointment or reschedule if I have to cancel. I will notify my provider of any barriers to my plan of care. I will follow my Zone Management tool to seek urgent or emergent care. I will notify my provider of any symptoms that indicate a worsening of my condition.     Barriers: impairment:  mental health: depression, financial, lack of support, and overwhelmed by complexity of regimen  Plan for overcoming my barriers: care coordination  Confidence: 9/10  Anticipated Goal Completion Date: 2023                Future Appointments   Date Time

## 2023-09-19 ENCOUNTER — OFFICE VISIT (OUTPATIENT)
Dept: FAMILY MEDICINE CLINIC | Age: 78
End: 2023-09-19
Payer: MEDICARE

## 2023-09-19 VITALS
HEIGHT: 65 IN | TEMPERATURE: 97.8 F | HEART RATE: 80 BPM | OXYGEN SATURATION: 96 % | SYSTOLIC BLOOD PRESSURE: 120 MMHG | DIASTOLIC BLOOD PRESSURE: 78 MMHG | WEIGHT: 150.6 LBS | RESPIRATION RATE: 16 BRPM | BODY MASS INDEX: 25.09 KG/M2

## 2023-09-19 DIAGNOSIS — E11.65 TYPE 2 DIABETES MELLITUS WITH HYPERGLYCEMIA, WITHOUT LONG-TERM CURRENT USE OF INSULIN (HCC): Primary | ICD-10-CM

## 2023-09-19 DIAGNOSIS — Z82.49 FAMILY HISTORY OF ANEURYSM OF BLOOD VESSEL OF BRAIN: ICD-10-CM

## 2023-09-19 PROCEDURE — 99214 OFFICE O/P EST MOD 30 MIN: CPT | Performed by: FAMILY MEDICINE

## 2023-09-19 PROCEDURE — 3078F DIAST BP <80 MM HG: CPT | Performed by: FAMILY MEDICINE

## 2023-09-19 PROCEDURE — 3074F SYST BP LT 130 MM HG: CPT | Performed by: FAMILY MEDICINE

## 2023-09-19 PROCEDURE — 1123F ACP DISCUSS/DSCN MKR DOCD: CPT | Performed by: FAMILY MEDICINE

## 2023-09-19 PROCEDURE — 3046F HEMOGLOBIN A1C LEVEL >9.0%: CPT | Performed by: FAMILY MEDICINE

## 2023-09-19 RX ORDER — DULAGLUTIDE 0.75 MG/.5ML
0.75 INJECTION, SOLUTION SUBCUTANEOUS WEEKLY
Qty: 4 ADJUSTABLE DOSE PRE-FILLED PEN SYRINGE | Refills: 3 | Status: SHIPPED | OUTPATIENT
Start: 2023-09-19

## 2023-09-19 ASSESSMENT — ENCOUNTER SYMPTOMS
DIARRHEA: 0
ABDOMINAL PAIN: 0
COUGH: 0
RHINORRHEA: 0
SORE THROAT: 0
NAUSEA: 0
EYES NEGATIVE: 1
VOMITING: 0
BACK PAIN: 0
CHEST TIGHTNESS: 0
SHORTNESS OF BREATH: 0

## 2023-09-19 NOTE — PROGRESS NOTES
capsule by mouth once daily 90 capsule 1    ONETOUCH VERIO strip USE 1 STRIP TO CHECK GLUCOSE ONCE DAILY AND AS NEEDED 100 each 3    glucose monitoring (FREESTYLE FREEDOM) kit 1 kit by Does not apply route daily ONE TOUCH VERIO FLEX 1 kit 0    glimepiride (AMARYL) 4 MG tablet Take 1 tablet by mouth with breakfast and with evening meal 180 tablet 3    atorvastatin (LIPITOR) 20 MG tablet Take 1 tablet by mouth once daily 90 tablet 1    Lancets MISC 1 each by Does not apply route daily 100 each 3    Alcohol Swabs (ALCOHOL PADS) 70 % PADS Use daily 100 each 3     No current facility-administered medications for this visit. Allergies   Allergen Reactions    Lisinopril Other (See Comments)     Dry mouth    Sulfa Antibiotics Hives and Other (See Comments)     hypotension     Health Maintenance   Topic Date Due    Diabetic foot exam  Never done    Hepatitis B vaccine (1 of 3 - Risk 3-dose series) Never done    Shingles vaccine (2 of 3) 12/20/2014    COVID-19 Vaccine (5 - Moderna series) 02/04/2023    Flu vaccine (1) 08/01/2023    Annual Wellness Visit (AWV)  11/22/2023    A1C test (Diabetic or Prediabetic)  12/12/2023    Diabetic retinal exam  01/16/2024    Depression Monitoring  06/19/2024    Lipids  09/12/2024    DTaP/Tdap/Td vaccine (2 - Td or Tdap) 01/08/2026    Colorectal Cancer Screen  06/08/2027    DEXA (modify frequency per FRAX score)  Completed    Pneumococcal 65+ years Vaccine  Completed    Hepatitis C screen  Completed    Hepatitis A vaccine  Aged Out    Hib vaccine  Aged Out    Meningococcal (ACWY) vaccine  Aged Out    Diabetic Alb to Cr ratio (uACR) test  Discontinued    Breast cancer screen  Discontinued         Objective:     Physical Exam  Constitutional:       General: She is not in acute distress. Appearance: Normal appearance. She is well-developed. She is not diaphoretic. HENT:      Head: Normocephalic and atraumatic.       Right Ear: External ear normal.      Left Ear: External ear normal.

## 2023-09-27 ENCOUNTER — CARE COORDINATION (OUTPATIENT)
Dept: CARE COORDINATION | Age: 78
End: 2023-09-27

## 2023-09-27 NOTE — CARE COORDINATION
Advised on SHAILESH Ferrer. She has CMS number and provided number for Karolina Johnson [phone]. Advised to check options ASAP.   Plan -   10/4 CT   Dietician referral to [facility] daily  Food resources - SW  Diet education for diabetes  Walk daily   Early symptom recognition and reporting to prevent ED and admissions  Use same or next day appts at PCP office for non-emergency problems  RPM for DM and HTN enrolled

## 2023-10-04 ENCOUNTER — HOSPITAL ENCOUNTER (OUTPATIENT)
Dept: CT IMAGING | Age: 78
Discharge: HOME | End: 2023-10-04
Attending: FAMILY MEDICINE
Payer: MEDICARE

## 2023-10-04 DIAGNOSIS — Z82.49 FAMILY HISTORY OF ANEURYSM OF BLOOD VESSEL OF BRAIN: ICD-10-CM

## 2023-10-04 PROCEDURE — 6360000004 HC RX CONTRAST MEDICATION: Performed by: FAMILY MEDICINE

## 2023-10-04 PROCEDURE — 70496 CT ANGIOGRAPHY HEAD: CPT

## 2023-10-04 RX ADMIN — IOPAMIDOL 80 ML: 755 INJECTION, SOLUTION INTRAVENOUS at 08:29

## 2023-10-09 ENCOUNTER — CARE COORDINATION (OUTPATIENT)
Dept: CARE COORDINATION | Age: 78
End: 2023-10-09

## 2023-10-09 NOTE — CARE COORDINATION
Remote Patient Monitoring Note  Date/Time:  10/9/2023 8:08 AM  Patient Current Location: [place]  LPN noted red alert received for weight increase (5 lbs x 7 days). Background: Pt enrolled in RPM r/t HTN, DM. Clinical Interventions: Reviewed and followed up on alerts and treatments-Per HRS chart review, inaccurate weight of 96.8 lbs entered on 10/6/23. Inaccurate weight removed from RPM metrics, weight is currently stable. Plan/Follow Up: Will continue to review, monitor and address alerts with follow up based on severity of symptoms and risk factors.

## 2023-10-11 ENCOUNTER — CARE COORDINATION (OUTPATIENT)
Dept: CARE COORDINATION | Age: 78
End: 2023-10-11

## 2023-10-11 NOTE — CARE COORDINATION
Ambulatory Care Coordination Note  10/11/2023    Patient Current Location:  Home: [address] [place] [id number]     MI DE PAZ contacted the patient by telephone. Verified name and  with patient as identifiers. Provided introduction to self, and explanation of the MI DE PAZ role. Challenges to be reviewed by the provider   Additional needs identified to be addressed with provider: No  none               Method of communication with provider: none. I spoke with the patient for continued Care Coordination follow up and education. Patient states she is doing well. Patient remains active with RPM.  BS consistently 100's. Patient denied any symptoms of hypo or hyperglycemia. Diabetic diet and importance of diet adherence reviewed with patient. Patient was also encouraged to work to remain active and reviewed how this also helps with BS control. CT was completed and pt pleased with results. Patient has all medications that are needed. Educated on how to identify sx's that are worse than the baseline and the importance of early symptom recognition and reporting to prevent exacerbation which may lead to ED visits and hospital admissions. I advised patient to contact PCP office if needed. No further needs at this time.          Lab Results       None                 Goals Addressed    None         Future Appointments   Date Time Provider [address]   2023 11:00 AM STR MAMMOGRAPHY RM2  [facility] STR Rad/Card   2024  9:15 AM Gloria Velazquez MD SRPX ROSY Mendez

## 2023-10-20 VITALS
SYSTOLIC BLOOD PRESSURE: 158 MMHG | OXYGEN SATURATION: 98 % | HEART RATE: 65 BPM | WEIGHT: 148.2 LBS | BODY MASS INDEX: 25.05 KG/M2 | DIASTOLIC BLOOD PRESSURE: 82 MMHG

## 2023-11-01 ENCOUNTER — CARE COORDINATION (OUTPATIENT)
Dept: CARE COORDINATION | Age: 78
End: 2023-11-01

## 2023-11-01 NOTE — CARE COORDINATION
Attempted care management follow up. Left message to return phone call to ambulatory care manager at [phone].   Plan -   11/13 mammogram  Dietician referral to [facility]  [facility] daily  Food resources -   Diet education for diabetes  Walk daily   Early symptom recognition and reporting to prevent ED and admissions  Use same or next day appts at PCP office for non-emergency problems  RPM for DM and HTN enrolled

## 2023-11-10 ENCOUNTER — CARE COORDINATION (OUTPATIENT)
Dept: CARE COORDINATION | Age: 78
End: 2023-11-10

## 2023-11-10 NOTE — CARE COORDINATION
Ambulatory Care Coordination Note  11/10/2023    Patient Current Location:  Home: [address] [place] [address],[phone]     ACM contacted the patient by telephone. Verified name and  with patient as identifiers. Provided introduction to self, and explanation of the ACM role. Challenges to be reviewed by the provider   Additional needs identified to be addressed with provider: No  none               Method of communication with provider: none. ACM: Britt Aguirre RN    Continues with RPM for DM and HTN. No recent alerts. BS are mostly good. Couple higher readings due to diet. Staying active daily. Plan -    mammogram  Dietician referral to [facility]  [facility] daily  Food resources -   Diet education for diabetes  Walk daily   Early symptom recognition and reporting to prevent ED and admissions  Use same or next day appts at PCP office for non-emergency problems  RPM for DM and HTN enrolled    Offered patient enrollment in the Remote Patient Monitoring (RPM) program for in-home monitoring: Yes, patient already enrolled. Lab Results       None            Care Coordination Interventions    Referral from Primary Care Provider: No  Suggested Interventions and Community Resources  Diabetes Education: In Process  Registered Dietician: Completed  Social Work: Completed (Comment: food insecurity)  Transportation Support: Declined  Zone Management Tools: Completed          Goals Addressed                   This Visit's Progress     Conditions and Symptoms   On track     I will schedule office visits, as directed by my provider. I will keep my appointment or reschedule if I have to cancel. I will notify my provider of any barriers to my plan of care. I will follow my Zone Management tool to seek urgent or emergent care. I will notify my provider of any symptoms that indicate a worsening of my condition.     Barriers: impairment:  mental health: depression, financial, lack of support, and overwhelmed by

## 2023-11-13 ENCOUNTER — HOSPITAL ENCOUNTER (OUTPATIENT)
Dept: WOMENS IMAGING | Age: 78
Discharge: HOME | End: 2023-11-13
Payer: MEDICARE

## 2023-11-13 DIAGNOSIS — Z12.31 VISIT FOR SCREENING MAMMOGRAM: ICD-10-CM

## 2023-11-13 PROCEDURE — 77063 BREAST TOMOSYNTHESIS BI: CPT

## 2023-11-17 RX ORDER — ATORVASTATIN CALCIUM 20 MG/1
TABLET, FILM COATED ORAL
Qty: 90 TABLET | Refills: 3 | Status: SHIPPED | OUTPATIENT
Start: 2023-11-17

## 2023-11-17 NOTE — TELEPHONE ENCOUNTER
Alphonse Lopez MD, patient out of refills and patient eligible for up to 90-day supply, if appropriate.      Prescription(s) pended for your signature/modification as appropriate for the following medication(s):  Atorvastatin 20 mg daily      Last Visit: 9/19/23  Next Visit: 1/19/24    Thank you,  Palak Villanueva, PharmD, [facility]  Department, toll free: [phone], option 1    ===============================================================================    POPULATION HEALTH CLINICAL PHARMACY: ADHERENCE REVIEW  Identified care gap per Anjali: fills at [facility]: Statin adherence    [address]  [address] [place] [address]  Phone: [phone] Fax: [phone]      Patient also appears to be prescribed: ACE/ARB, Diabetes, and Statin     Current Outpatient Medications   Medication Instructions    Alcohol Swabs (ALCOHOL PADS) 70 % PADS Use daily    amLODIPine (NORVASC) 5 MG tablet Take 1 tablet by mouth once daily    atorvastatin (LIPITOR) 20 MG tablet Take 1 tablet by mouth once daily    busPIRone (BUSPAR) 15 MG tablet Take 1 tablet by mouth once daily    DULoxetine (CYMBALTA) 60 MG extended release capsule Take 1 capsule by mouth once daily    empagliflozin (JARDIANCE) 25 mg, Oral, DAILY    glimepiride (AMARYL) 4 mg, Oral, 2 times daily with meals    glucose monitoring (FREESTYLE FREEDOM) kit 1 kit, Does not apply, DAILY, ONE TOUCH VERIO FLEX    Lancets MISC 1 each, Does not apply, DAILY    losartan (COZAAR) 50 MG tablet Take 1 tablet by mouth once daily    metFORMIN (GLUCOPHAGE-XR) 1,000 mg, Oral, DAILY WITH BREAKFAST    ONETOUCH VERIO strip USE 1 STRIP TO CHECK GLUCOSE ONCE DAILY AND AS NEEDED    pantoprazole (PROTONIX) 40 MG tablet Take 1 tablet by mouth once daily    Trulicity 1.93 mg, SubCUTAneous, WEEKLY         ASSESSMENT    DIABETES ADHERENCE  Insurance Records

## 2023-11-17 NOTE — TELEPHONE ENCOUNTER
Spoke with [address] [place] [address] Avenue to confirm atorvastatin prescription was received. Also refilled losartan as medication was due to refill on 23. Attempted to reach patient to discuss refills to  at [facility]. Not able to leave a voicemail message - mail box is full. Will send a [employer] message.     Ju Blair, PharmD, [facility]  Department, toll free: [phone], option 1      For Pharmacy Admin Tracking Only  Program: Anum in place:  No  Recommendation Provided To: Provider: 1 via Note to Provider and Pharmacy: 1  Intervention Detail: Adherence Monitorin and Refill(s) Provided  Intervention Accepted By: Provider: 1 and Pharmacy: 1  Gap Closed?: No   Time Spent (min): 30

## 2023-11-21 ENCOUNTER — CARE COORDINATION (OUTPATIENT)
Dept: CARE COORDINATION | Age: 78
End: 2023-11-21

## 2023-11-21 NOTE — CARE COORDINATION
11/21/23 1:50 pm Patient is currently enrolled in Remote Patient Monitoring for Diabetes and HTN. Received return call from emergency contact, James Officer. She states she will try to reach pt and have her return call.

## 2023-11-21 NOTE — CARE COORDINATION
Remote Alert Monitoring Note  Rpm alert to be reviewed by the provider   yellow alert   blood pressure reading (173/73)   Additional needs to be addressed by N/A: No      Date/Time:  11/21/2023 9:57 AM  Patient Current Location: [place]  Background: Pt enrolled in RPM r/t HTN, DM. Clinical Interventions: Reviewed and followed up on alerts and treatments-Multiple attempts to reach pt today to review yellow alert. Pt voice mailbox full and unable to leave message. Spoke with emergency contact, Awa Nieves who stated she would attempt to reach pt and have her return call. No return call received as of this time. Will route to [facility] to update. Plan/Follow Up: Will continue to review, monitor and address alerts with follow up based on severity of symptoms and risk factors.

## 2023-11-21 NOTE — CARE COORDINATION
Date/Time:  11/21/2023 9:59 AM  LPN attempted to reach patient by telephone regarding yellow alert in remote patient monitoring program. Voice mailbox full and unable to leave message requesting return call. Will attempt to reach patient again.

## 2023-11-21 NOTE — CARE COORDINATION
11/21/23 1:31 PM Patient is currently enrolled in Remote Patient Monitoring for Diabetes and HTN. Multiple attempts to reach pt to discuss yellow alert with no answer. Voice mailbox full and unable to leave message requesting return call. Attempted to reach emergency contact, Krishna España and left HIPAA compliant voice message requesting return call.

## 2023-12-01 ENCOUNTER — CARE COORDINATION (OUTPATIENT)
Dept: CARE COORDINATION | Age: 78
End: 2023-12-01

## 2023-12-01 RX ORDER — ANTIOX #8/OM3/DHA/EPA/LUT/ZEAX 250-2.5 MG
2 CAPSULE ORAL DAILY
COMMUNITY

## 2023-12-26 ENCOUNTER — CARE COORDINATION (OUTPATIENT)
Dept: CARE COORDINATION | Age: 78
End: 2023-12-26

## 2023-12-26 NOTE — CARE COORDINATION
Remote Patient Monitoring Note  Date/Time:  2023 2:32 PM  Patient Current Location: [place]  LPN contacted patient by telephone regarding yellow alert received for no BP or weight x > 2 days. Verified patients name and  as identifiers. Background: Pt enrolled in RPM r/t HTN, DM. Clinical Interventions: Reviewed and followed up on alerts and treatments-Spoke with pt who states she has been staying with sister in [place] for the holiday. She took equipment with her, but did not take scale or tablet to update. She requested nurse enter metrics at this time. All metrics are noted to be within RPM parameters. She states she will possibly return home tomorrow, but if not she will again write metrics down and call for assist with entering. Plan/Follow Up: Will continue to review, monitor and address alerts with follow up based on severity of symptoms and risk factors.

## 2023-12-26 NOTE — CARE COORDINATION
Attempted care management follow up. No answer and VM full.   Plan -   FBS daily  Walk daily   Info sent for Silver Sneakers  Early symptom recognition and reporting to prevent ED and admissions  Use same or next day appts at PCP office for non-emergency problems  RPM for DM and HTN enrolled

## 2023-12-27 ENCOUNTER — CARE COORDINATION (OUTPATIENT)
Dept: CARE COORDINATION | Age: 78
End: 2023-12-27

## 2023-12-27 NOTE — CARE COORDINATION
Remote Patient Monitoring Note  Date/Time:  12/27/2023 4:16 PM  Patient Current Location: [place]  LPN noted yellow alert received for no weight entered x > 2 days. Background: Pt enrolled in [facility] r/t HTN, DM. Clinical Interventions: Reviewed and followed up on alerts and treatments-Spoke with pt yesterday regarding no metrics x > 2 days and she verbalized that she was out of town for holiday. She took BP monitor and oximeter, but did not take scale because it was to heavy. Pt states she will be returning home in the next day or so. Plan/Follow Up: Will continue to review, monitor and address alerts with follow up based on severity of symptoms and risk factors.

## 2023-12-28 ENCOUNTER — CARE COORDINATION (OUTPATIENT)
Dept: CARE COORDINATION | Age: 78
End: 2023-12-28

## 2023-12-28 NOTE — CARE COORDINATION
Remote Patient Monitoring Note  Date/Time:  2023 2:38 PM  Patient Current Location: [place]  LPN contacted patient by telephone regarding yellow alert received for no BP or weight x 2 days. Verified patients name and  as identifiers. Background: Pt enrolled in RPM r/t HTN, DM. Clinical Interventions: Reviewed and followed up on alerts and treatments-Spoke with pt who is still staying with her daughter. She took BP monitor, oximeter and glucometer, but did not take tablet or scale. Pt requests nurse to enter metrics from yesterday and today. Metrics updated in HRS. All metrics are within RPM parameters. Plan/Follow Up: Will continue to review, monitor and address alerts with follow up based on severity of symptoms and risk factors.

## 2024-01-02 ENCOUNTER — CARE COORDINATION (OUTPATIENT)
Dept: CARE COORDINATION | Age: 79
End: 2024-01-02

## 2024-01-02 NOTE — CARE COORDINATION
Ambulatory Care Coordination Note  2024    Patient Current Location:  Home: [address] [place] [address]  [place] [address]     ACM contacted the patient by telephone. Verified name and  with patient as identifiers. Provided introduction to self, and explanation of the ACM role.     Challenges to be reviewed by the provider   Additional needs identified to be addressed with provider: No  none               Method of communication with provider: none.    ACM: Nae Nogueira RN    . Ate a tangerine before checking. Otherwise -170.  Good medication adherence  Continues with RPM  Staying active daily.     Plan -    eye exam   PCP  FBS daily  Walk daily   Info sent for Silver Sneakers  Early symptom recognition and reporting to prevent ED and admissions  Use same or next day appts at PCP office for non-emergency problems  RPM for DM and HTN enrolled    Offered patient enrollment in the Remote Patient Monitoring (RPM) program for in-home monitoring: Yes, patient already enrolled.    Lab Results       None            Care Coordination Interventions    Referral from Primary Care Provider: No  Suggested Interventions and Community Resources  Diabetes Education: Completed  Meals on Wheels: Declined  Registered Dietician: Completed  Social Work: Completed (Comment: food insecurity)  Transportation Support: Declined  Zone Management Tools: Completed          Goals Addressed                   This Visit's Progress     Conditions and Symptoms   On track     I will schedule office visits, as directed by my provider.  I will keep my appointment or reschedule if I have to cancel.  I will notify my provider of any barriers to my plan of care.  I will follow my Zone Management tool to seek urgent or emergent care.  I will notify my provider of any symptoms that indicate a worsening of my condition.    Barriers: impairment:  mental health: depression, financial, lack of support, and overwhelmed by complexity of

## 2024-01-03 ENCOUNTER — CARE COORDINATION (OUTPATIENT)
Dept: CARE COORDINATION | Facility: CLINIC | Age: 79
End: 2024-01-03

## 2024-01-03 NOTE — CARE COORDINATION
Remote Alert Monitoring Note  Rpm alert to be reviewed by the provider   yellow alert   blood pressure reading (171/89)   Additional needs to be addressed by N/A: No                    Date/Time:  1/3/2024 11:32 AM  Patient Current Location: Home: [address] [place] [address] [place]  [place] [address]  LPN contacted patient by telephone. Verified patients name and  as identifiers.  Background: Pt enrolled in RPM r/t HTN and DM.   Refer to 911 immediately if:  Patient unresponsive or unable to provide history  Change in cognition or sudden confusion  Patient unable to respond in complete sentences  Intense chest pain/tightness  Any concern for any clinical emergency  Red Alert: Provider response time of 1 hr required for any red alert requiring intervention  Yellow Alert: Provider response time of 3hr required for any escalated yellow alert    BP Triage  Are you having any Chest Pain? no   Are you having any Shortness of Breath? no   Do you have a headache or have any vision changes? no   Are you having any numbness or tingling? no   Are you having any other health concerns or issues? no        Clinical Interventions: Reviewed and followed up on alerts and treatments-Pt denies CP, SOB, headache, vision changes, numbness, and tingling at this time. Pt states she was out of town for longer than anticipated and she did not take enough medication with her. As a result, pt states she only took half tabs of all po medications on Monday and Tuesday. Pt confirms she has taken medication as prescribed today and is agreeable to recheck BP. Updated reading of 168/91 noted in HRS and is within RPM parameters. Pt verbalizes understanding of medication compliance, when to notify provider(s) of changes / concerns, and when to seek emergent medical care.   Plan/Follow Up: Will continue to review, monitor and address alerts with follow up based on severity of symptoms and risk factors.

## 2024-01-04 ENCOUNTER — CARE COORDINATION (OUTPATIENT)
Dept: CARE COORDINATION | Age: 79
End: 2024-01-04

## 2024-01-04 NOTE — CARE COORDINATION
01/04/24 12:27 PM Patient is currently enrolled in Remote Patient Monitoring for Diabetes and HTN.  Pt called this nurse to update that she rechecked glucose at 12:26 pm with updated reading of 160.  Glucose now within RPM parameters.  Plan/Follow Up: Will continue to review, monitor and address alerts with follow up based on severity of symptoms and risk factors.

## 2024-01-04 NOTE — CARE COORDINATION
Remote Alert Monitoring Note  Rpm alert to be reviewed by the provider   red alert   glucose reading (303)   Additional needs to be addressed by PCP: FYI Pt requesting refill be sent to pharmacy for Trulicity.  Spoke with pt who states she has been having difficulty sleeping, stomach upset, headache and increased congestion since coming home from her daughters house where she spent the holidays.  Denies any vision change, increased thirst, increased urination.  Glucose checked prior to eating this morning and states she had chicken noodle soup last night for dinner and popcorn as a snack.  She verbalizes taking Metformin 1000 mg daily, Jardiance 25 mg daily and Trulicity 0.75 mg weekly with last dose yesterday morning.  Pt has not taken any medications as of this time and states she will do so now and recheck glucose again later this morning. RPM metrics added for review.          Date/Time:  2024 9:35 AM  Patient Current Location: [facility] contacted patient by telephone. Verified patients name and  as identifiers.  Background: Pt enrolled in RPM r/t HTN, DM.  Refer to 911 immediately if:  Patient unresponsive or unable to provide history  Change in cognition or sudden confusion  Patient unable to respond in complete sentences  Intense chest pain/tightness  Any concern for any clinical emergency  Red Alert: Provider response time of 1 hr required for any red alert requiring intervention  Yellow Alert: Provider response time of 3hr required for any escalated yellow alert  Hyperglycemia Triage  Are you having any vision changes? no   Are you having any increased thirst? no   Do you feel sweaty/clammy? no   Are you having increased urination? no   Are you having increased fatigue? no   Clinical Interventions: Reviewed and followed up on alerts and treatments-Spoke with pt who states she has been having difficulty sleeping, stomach upset, headache and increased congestion since coming home from her daughters

## 2024-01-07 DIAGNOSIS — E11.65 TYPE 2 DIABETES MELLITUS WITH HYPERGLYCEMIA, WITHOUT LONG-TERM CURRENT USE OF INSULIN (HCC): ICD-10-CM

## 2024-01-08 RX ORDER — DULAGLUTIDE 0.75 MG/.5ML
INJECTION, SOLUTION SUBCUTANEOUS
Qty: 4 ML | Refills: 0 | Status: SHIPPED | OUTPATIENT
Start: 2024-01-08

## 2024-01-19 ENCOUNTER — OFFICE VISIT (OUTPATIENT)
Dept: FAMILY MEDICINE CLINIC | Age: 79
End: 2024-01-19
Payer: MEDICARE

## 2024-01-19 ENCOUNTER — CARE COORDINATION (OUTPATIENT)
Dept: CARE COORDINATION | Age: 79
End: 2024-01-19

## 2024-01-19 VITALS
BODY MASS INDEX: 24.49 KG/M2 | WEIGHT: 147 LBS | DIASTOLIC BLOOD PRESSURE: 60 MMHG | SYSTOLIC BLOOD PRESSURE: 110 MMHG | OXYGEN SATURATION: 98 % | TEMPERATURE: 98.2 F | RESPIRATION RATE: 16 BRPM | HEART RATE: 97 BPM | HEIGHT: 65 IN

## 2024-01-19 DIAGNOSIS — Z00.00 MEDICARE ANNUAL WELLNESS VISIT, SUBSEQUENT: Primary | ICD-10-CM

## 2024-01-19 DIAGNOSIS — F33.42 MAJOR DEPRESSIVE DISORDER, RECURRENT, IN FULL REMISSION (HCC): ICD-10-CM

## 2024-01-19 DIAGNOSIS — E11.65 TYPE 2 DIABETES MELLITUS WITH HYPERGLYCEMIA, WITHOUT LONG-TERM CURRENT USE OF INSULIN (HCC): ICD-10-CM

## 2024-01-19 PROCEDURE — G0439 PPPS, SUBSEQ VISIT: HCPCS | Performed by: FAMILY MEDICINE

## 2024-01-19 PROCEDURE — 3078F DIAST BP <80 MM HG: CPT | Performed by: FAMILY MEDICINE

## 2024-01-19 PROCEDURE — 1123F ACP DISCUSS/DSCN MKR DOCD: CPT | Performed by: FAMILY MEDICINE

## 2024-01-19 PROCEDURE — 3074F SYST BP LT 130 MM HG: CPT | Performed by: FAMILY MEDICINE

## 2024-01-19 RX ORDER — BUSPIRONE HYDROCHLORIDE 15 MG/1
TABLET ORAL
Qty: 90 TABLET | Refills: 3 | Status: SHIPPED | OUTPATIENT
Start: 2024-01-19

## 2024-01-19 RX ORDER — BLOOD SUGAR DIAGNOSTIC
STRIP MISCELLANEOUS
Qty: 100 EACH | Refills: 3 | Status: SHIPPED | OUTPATIENT
Start: 2024-01-19 | End: 2024-01-19 | Stop reason: SDUPTHER

## 2024-01-19 RX ORDER — DULAGLUTIDE 0.75 MG/.5ML
INJECTION, SOLUTION SUBCUTANEOUS
Qty: 4 ML | Refills: 3 | Status: CANCELLED | OUTPATIENT
Start: 2024-01-19

## 2024-01-19 RX ORDER — GLIMEPIRIDE 4 MG/1
4 TABLET ORAL 2 TIMES DAILY WITH MEALS
Qty: 180 TABLET | Refills: 3 | Status: SHIPPED | OUTPATIENT
Start: 2024-01-19

## 2024-01-19 RX ORDER — METFORMIN HYDROCHLORIDE 500 MG/1
1000 TABLET, EXTENDED RELEASE ORAL
Qty: 180 TABLET | Refills: 3 | Status: SHIPPED | OUTPATIENT
Start: 2024-01-19

## 2024-01-19 RX ORDER — LOSARTAN POTASSIUM 50 MG/1
TABLET ORAL
Qty: 90 TABLET | Refills: 3 | Status: SHIPPED | OUTPATIENT
Start: 2024-01-19

## 2024-01-19 RX ORDER — AMLODIPINE BESYLATE 5 MG/1
TABLET ORAL
Qty: 90 TABLET | Refills: 3 | Status: SHIPPED | OUTPATIENT
Start: 2024-01-19

## 2024-01-19 RX ORDER — BLOOD SUGAR DIAGNOSTIC
STRIP MISCELLANEOUS
Qty: 100 EACH | Refills: 3 | Status: SHIPPED | OUTPATIENT
Start: 2024-01-19

## 2024-01-19 RX ORDER — DULOXETIN HYDROCHLORIDE 60 MG/1
CAPSULE, DELAYED RELEASE ORAL
Qty: 90 CAPSULE | Refills: 3 | Status: SHIPPED | OUTPATIENT
Start: 2024-01-19

## 2024-01-19 RX ORDER — PANTOPRAZOLE SODIUM 40 MG/1
TABLET, DELAYED RELEASE ORAL
Qty: 90 TABLET | Refills: 3 | Status: SHIPPED | OUTPATIENT
Start: 2024-01-19

## 2024-01-19 SDOH — ECONOMIC STABILITY: FOOD INSECURITY: WITHIN THE PAST 12 MONTHS, YOU WORRIED THAT YOUR FOOD WOULD RUN OUT BEFORE YOU GOT MONEY TO BUY MORE.: NEVER TRUE

## 2024-01-19 SDOH — ECONOMIC STABILITY: HOUSING INSECURITY
IN THE LAST 12 MONTHS, WAS THERE A TIME WHEN YOU DID NOT HAVE A STEADY PLACE TO SLEEP OR SLEPT IN A SHELTER (INCLUDING NOW)?: NO

## 2024-01-19 SDOH — ECONOMIC STABILITY: FOOD INSECURITY: WITHIN THE PAST 12 MONTHS, THE FOOD YOU BOUGHT JUST DIDN'T LAST AND YOU DIDN'T HAVE MONEY TO GET MORE.: NEVER TRUE

## 2024-01-19 SDOH — ECONOMIC STABILITY: INCOME INSECURITY: HOW HARD IS IT FOR YOU TO PAY FOR THE VERY BASICS LIKE FOOD, HOUSING, MEDICAL CARE, AND HEATING?: NOT HARD AT ALL

## 2024-01-19 ASSESSMENT — LIFESTYLE VARIABLES
HOW OFTEN DO YOU HAVE A DRINK CONTAINING ALCOHOL: NEVER
HOW MANY STANDARD DRINKS CONTAINING ALCOHOL DO YOU HAVE ON A TYPICAL DAY: PATIENT DOES NOT DRINK

## 2024-01-19 ASSESSMENT — PATIENT HEALTH QUESTIONNAIRE - PHQ9
SUM OF ALL RESPONSES TO PHQ QUESTIONS 1-9: 2
10. IF YOU CHECKED OFF ANY PROBLEMS, HOW DIFFICULT HAVE THESE PROBLEMS MADE IT FOR YOU TO DO YOUR WORK, TAKE CARE OF THINGS AT HOME, OR GET ALONG WITH OTHER PEOPLE: 0
1. LITTLE INTEREST OR PLEASURE IN DOING THINGS: 0
5. POOR APPETITE OR OVEREATING: 1
SUM OF ALL RESPONSES TO PHQ9 QUESTIONS 1 & 2: 0
3. TROUBLE FALLING OR STAYING ASLEEP: 1
9. THOUGHTS THAT YOU WOULD BE BETTER OFF DEAD, OR OF HURTING YOURSELF: 0
SUM OF ALL RESPONSES TO PHQ QUESTIONS 1-9: 2
7. TROUBLE CONCENTRATING ON THINGS, SUCH AS READING THE NEWSPAPER OR WATCHING TELEVISION: 0
6. FEELING BAD ABOUT YOURSELF - OR THAT YOU ARE A FAILURE OR HAVE LET YOURSELF OR YOUR FAMILY DOWN: 0
2. FEELING DOWN, DEPRESSED OR HOPELESS: 0
SUM OF ALL RESPONSES TO PHQ QUESTIONS 1-9: 2
SUM OF ALL RESPONSES TO PHQ QUESTIONS 1-9: 2
8. MOVING OR SPEAKING SO SLOWLY THAT OTHER PEOPLE COULD HAVE NOTICED. OR THE OPPOSITE, BEING SO FIGETY OR RESTLESS THAT YOU HAVE BEEN MOVING AROUND A LOT MORE THAN USUAL: 0
4. FEELING TIRED OR HAVING LITTLE ENERGY: 0

## 2024-01-19 NOTE — PROGRESS NOTES
Pharmacist at [facility] pt has Medicare insurance and test strips cannot say prn. Rx resent to check daily

## 2024-01-19 NOTE — CARE COORDINATION
Remote Patient Monitoring Note  Date/Time:  1/19/2024 7:56 AM  Pt is enrolled in Remote Patient Monitoring r/t HTN and DM.  She has a scheduled offcie visit today at 9:15 AM.  RPM metrics added for review.  LPN reviewed patients reported daily Remote Patient Monitoring metrics. All reported metrics are within alert parameters.     Plan/Follow Up: Will continue to review, monitor and address alerts with follow up based on severity of symptoms and risk factors.

## 2024-01-20 NOTE — PROGRESS NOTES
Medicare Annual Wellness Visit    Sissy Dominique is here for Medicare AWV and Discuss Labs    Assessment & Plan   Medicare annual wellness visit, subsequent  Type 2 diabetes mellitus with hyperglycemia, without long-term current use of insulin (HCC)  -     empagliflozin (JARDIANCE) 25 MG tablet; Take 1 tablet by mouth daily, Disp-90 tablet, R-3Normal  -     metFORMIN (GLUCOPHAGE-XR) 500 MG extended release tablet; Take 2 tablets by mouth daily (with breakfast), Disp-180 tablet, R-3Normal  -     Hemoglobin A1C; Future  -     Comprehensive Metabolic Panel; Future  -     blood glucose test strips (ONETOUCH VERIO) strip; Test Blood Sugar once a day  E11.65, Disp-100 each, R-3Normal  Major depressive disorder, recurrent, in full remission (HCC)    Recommendations for Preventive Services Due: see orders and patient instructions/AVS.  Recommended screening schedule for the next 5-10 years is provided to the patient in written form: see Patient Instructions/AVS.     No follow-ups on file.     Subjective   The following acute and/or chronic problems were also addressed today:   Diagnosis Orders   1. Medicare annual wellness visit, subsequent        2. Type 2 diabetes mellitus with hyperglycemia, without long-term current use of insulin (HCC)  empagliflozin (JARDIANCE) 25 MG tablet    metFORMIN (GLUCOPHAGE-XR) 500 MG extended release tablet    Hemoglobin A1C    Comprehensive Metabolic Panel    blood glucose test strips (ONETOUCH VERIO) strip    DISCONTINUED: blood glucose test strips (ONETOUCH VERIO) strip      3. Major depressive disorder, recurrent, in full remission (HCC)              Patient's complete Health Risk Assessment and screening values have been reviewed and are found in Flowsheets. The following problems were reviewed today and where indicated follow up appointments were made and/or referrals ordered.    Positive Risk Factor Screenings with Interventions:                 Dentist Screen:  Have you seen the

## 2024-01-22 ENCOUNTER — CARE COORDINATION (OUTPATIENT)
Dept: CARE COORDINATION | Age: 79
End: 2024-01-22

## 2024-01-22 NOTE — CARE COORDINATION
Attempted to reach patient for continued Care Coordination follow up and education.  Patient was unavailable at the time of my call, and mailbox is full.

## 2024-01-29 ENCOUNTER — CARE COORDINATION (OUTPATIENT)
Dept: CARE COORDINATION | Age: 79
End: 2024-01-29

## 2024-01-29 NOTE — CARE COORDINATION
Ambulatory Care Coordination Note  2024    Patient Current Location:  Home: [address] [place] [address]  [place] [address]     MI DE PAZ contacted the patient by telephone. Verified name and  with patient as identifiers. Provided introduction to self, and explanation of the MI DE PAZ role.     Challenges to be reviewed by the provider   Additional needs identified to be addressed with provider: No  none               Method of communication with provider: none.    I spoke with the patient for continued Care Coordination follow up and education.  Patient states she is doing good.  Breathing at baseline.  Denies increased SOB, cough or CP.  We discussed Zone management tools for chronic disease(s) and patient denies current questions re: s/sx at this time.  Patient remains active with RPM.  Patient requesting new pulse oximeter as hers is currently not working properly.  Will route note to RPM pool.  Educated on how to identify sx's that are worse than the baseline and the importance of early symptom recognition and reporting to prevent exacerbation which may lead to ED visits and hospital admissions.  I advised patient to contact PCP office if needed.  No further needs at this time.       Lab Results       None            Care Coordination Interventions    Referral from Primary Care Provider: No  Suggested Interventions and Community Resources  Diabetes Education: Completed  Meals on Wheels: Declined  Registered Dietician: Completed  Social Work: Completed (Comment: food insecurity)  Transportation Support: Declined  Zone Management Tools: Completed          Goals Addressed    None         Future Appointments   Date Time Provider Department Center   2025  9:15 AM Ismael Teague MD [facility]

## 2024-01-31 ENCOUNTER — CARE COORDINATION (OUTPATIENT)
Dept: CARE COORDINATION | Age: 79
End: 2024-01-31

## 2024-02-05 ENCOUNTER — CARE COORDINATION (OUTPATIENT)
Dept: CASE MANAGEMENT | Age: 79
End: 2024-02-05

## 2024-02-05 NOTE — CARE COORDINATION
Remote Alert Monitoring Note  Rpm alert to be reviewed by the provider   red alert/   blood pressure reading (184/69)   Additional needs to be addressed by N/A: No                    Date/Time:  2024 9:51 AM  Patient Current Location: Home: [address] [place] [address]  [place] [address]  LPN contacted patient by telephone. Verified patients name and  as identifiers.  Background: HTN DM  Refer to 911 immediately if:  Patient unresponsive or unable to provide history  Change in cognition or sudden confusion  Patient unable to respond in complete sentences  Intense chest pain/tightness  Any concern for any clinical emergency  Red Alert: Provider response time of 1 hr required for any red alert requiring intervention  Yellow Alert: Provider response time of 3hr required for any escalated yellow alert    BP Triage  Are you having any Chest Pain? no   Are you having any Shortness of Breath? no   Do you have a headache or have any vision changes? no   Are you having any numbness or tingling? no   Are you having any other health concerns or issues? no        Clinical Interventions: Reviewed and followed up on alerts and treatments-Spoke to patient she denies chest pain, Sob, dizziness, headache , vision changes, tingling, she just had breakfast and has not taken her medications, patient will recheck BP 1 hour after medications are taken has no concerns at present time.     Plan/Follow Up: Will continue to review, monitor and address alerts with follow up based on severity of symptoms and risk factors.

## 2024-02-08 ENCOUNTER — CARE COORDINATION (OUTPATIENT)
Dept: CARE COORDINATION | Age: 79
End: 2024-02-08

## 2024-02-08 NOTE — CARE COORDINATION
Date/Time:  2/8/2024 10:36 AM  LPN attempted to reach patient by telephone regarding yellow alert in remote patient monitoring program. No answer and voice mail box full.  Unable to leave message requesting return call.   Will attempt to reach patient again.

## 2024-02-08 NOTE — CARE COORDINATION
02/08/24 1:35 PM Patient is currently enrolled in Remote Patient Monitoring for Diabetes and HTN.  Second attempt to reach pt to review RPM yellow alert.  No answer and voice mailbox full.  Attempted to contact emergency contact, Zo Vicente with no answer.  HIPAA compliant voice message left requesting return call.

## 2024-02-08 NOTE — CARE COORDINATION
Remote Alert Monitoring Note  Rpm alert to be reviewed by the provider   yellow alert   blood pressure reading (175/97)   Additional needs to be addressed by N/A: No      Date/Time:  2/8/2024 10:28 AM  Patient Current Location: [place]  Background: Pt enrolled in RPM r/t HTN, DM.  Clinical Interventions: Reviewed and followed up on alerts and treatments-No return call received as of this time.  Will route to [facility] to update.   Plan/Follow Up: Will continue to review, monitor and address alerts with follow up based on severity of symptoms and risk factors.

## 2024-02-12 ENCOUNTER — CARE COORDINATION (OUTPATIENT)
Dept: CARE COORDINATION | Age: 79
End: 2024-02-12

## 2024-02-12 NOTE — CARE COORDINATION
Attempted care management follow up. No answer and no VM.  Plan -   Working toward graduation RPM and ACM  FBS daily  Walk daily   Info sent for Silver Sneakers  Early symptom recognition and reporting to prevent ED and admissions  Use same or next day appts at PCP office for non-emergency problems  RPM for DM and HTN enrolled

## 2024-02-17 DIAGNOSIS — E11.65 TYPE 2 DIABETES MELLITUS WITH HYPERGLYCEMIA, WITHOUT LONG-TERM CURRENT USE OF INSULIN (HCC): ICD-10-CM

## 2024-02-19 ENCOUNTER — CARE COORDINATION (OUTPATIENT)
Dept: CARE COORDINATION | Age: 79
End: 2024-02-19

## 2024-02-19 RX ORDER — DULAGLUTIDE 0.75 MG/.5ML
INJECTION, SOLUTION SUBCUTANEOUS
Qty: 4 ML | Refills: 0 | Status: SHIPPED | OUTPATIENT
Start: 2024-02-19

## 2024-02-19 NOTE — CARE COORDINATION
Ambulatory Care Coordination Note  2024    Patient Current Location:  Home: [address] [place] [address] [place]  [place] [address]     ACM contacted the patient by telephone. Verified name and  with patient as identifiers. Provided introduction to self, and explanation of the ACM role.     Challenges to be reviewed by the provider   Additional needs identified to be addressed with provider: No  none               Method of communication with provider: none.    ACM: Nae Nogueira RN    Reviewed graduation for RPM. Patient requesting to stay on program.   Diabetes - last a1c 7.5. due to follow up with PCP. FBS range 138-180. Good medication adherence.     Plan -   Working toward graduation RPM and ACM  FBS daily  Walk daily   Info sent for Silver Sneakers  Early symptom recognition and reporting to prevent ED and admissions  Use same or next day appts at PCP office for non-emergency problems  RPM for DM and HTN enrolled    Offered patient enrollment in the Remote Patient Monitoring (RPM) program for in-home monitoring: Yes, patient already enrolled.    Lab Results       None            Care Coordination Interventions    Referral from Primary Care Provider: No  Suggested Interventions and Community Resources  Diabetes Education: Completed  Meals on Wheels: Declined  Registered Dietician: Completed  Social Work: Completed (Comment: food insecurity)  Transportation Support: Declined  Zone Management Tools: Completed          Goals Addressed                   This Visit's Progress     Conditions and Symptoms   On track     I will schedule office visits, as directed by my provider.  I will keep my appointment or reschedule if I have to cancel.  I will notify my provider of any barriers to my plan of care.  I will follow my Zone Management tool to seek urgent or emergent care.  I will notify my provider of any symptoms that indicate a worsening of my condition.    Barriers: impairment:  mental health: depression, financial, lack

## 2024-02-21 ENCOUNTER — CARE COORDINATION (OUTPATIENT)
Dept: CASE MANAGEMENT | Age: 79
End: 2024-02-21

## 2024-02-21 NOTE — CARE COORDINATION
Remote Alert Monitoring Note  Rpm alert to be reviewed by the provider   red alert   blood pressure reading (190/94)   Additional needs to be addressed by N/A: No                    Date/Time:  2024 10:11 AM  Patient Current Location: Home: [address] [place] [address]  [facility] [address]  LPN contacted patient by telephone. Verified patients name and  as identifiers.  Background: HTN, DM  Refer to 911 immediately if:  Patient unresponsive or unable to provide history  Change in cognition or sudden confusion  Patient unable to respond in complete sentences  Intense chest pain/tightness  Any concern for any clinical emergency  Red Alert: Provider response time of 1 hr required for any red alert requiring intervention  Yellow Alert: Provider response time of 3hr required for any escalated yellow alert    BP Triage  Are you having any Chest Pain? no   Are you having any Shortness of Breath? no   Do you have a headache or have any vision changes? no   Are you having any numbness or tingling? no   Are you having any other health concerns or issues? no        Clinical Interventions: Reviewed and followed up on alerts and treatments-Spoke to patient she states she is doing well today, denies chest pain, dizziness, headache, vision changes and SOB, recheck of Bp is now WNL. Will continue to monitor daily vitals     Plan/Follow Up: Will continue to review, monitor and address alerts with follow up based on severity of symptoms and risk factors.

## 2024-02-22 ENCOUNTER — OFFICE VISIT (OUTPATIENT)
Dept: FAMILY MEDICINE CLINIC | Age: 79
End: 2024-02-22

## 2024-02-22 VITALS
SYSTOLIC BLOOD PRESSURE: 134 MMHG | DIASTOLIC BLOOD PRESSURE: 82 MMHG | OXYGEN SATURATION: 97 % | HEIGHT: 65 IN | TEMPERATURE: 97.9 F | WEIGHT: 149 LBS | HEART RATE: 72 BPM | BODY MASS INDEX: 24.83 KG/M2

## 2024-02-22 DIAGNOSIS — E78.5 HYPERLIPIDEMIA, UNSPECIFIED HYPERLIPIDEMIA TYPE: ICD-10-CM

## 2024-02-22 DIAGNOSIS — I10 PRIMARY HYPERTENSION: Primary | Chronic | ICD-10-CM

## 2024-02-22 DIAGNOSIS — F33.42 MAJOR DEPRESSIVE DISORDER, RECURRENT, IN FULL REMISSION (HCC): ICD-10-CM

## 2024-02-22 DIAGNOSIS — E11.65 TYPE 2 DIABETES MELLITUS WITH HYPERGLYCEMIA, WITHOUT LONG-TERM CURRENT USE OF INSULIN (HCC): ICD-10-CM

## 2024-02-22 RX ORDER — BLOOD SUGAR DIAGNOSTIC
STRIP MISCELLANEOUS
Qty: 100 EACH | Refills: 3 | Status: SHIPPED | OUTPATIENT
Start: 2024-02-22

## 2024-02-26 ENCOUNTER — CARE COORDINATION (OUTPATIENT)
Dept: CASE MANAGEMENT | Age: 79
End: 2024-02-26

## 2024-02-26 NOTE — CARE COORDINATION
Remote Alert Monitoring Note  Rpm alert to be reviewed by the provider   red alert   blood pressure reading (184/96)   Additional needs to be addressed by N/A: No                    Date/Time:  2024 10:41 AM  Patient Current Location: Home: [address] [place] [address] [place]  [place] [address]  LPN contacted patient by telephone. Verified patients name and  as identifiers.  Background: HTN, DM  Refer to 911 immediately if:  Patient unresponsive or unable to provide history  Change in cognition or sudden confusion  Patient unable to respond in complete sentences  Intense chest pain/tightness  Any concern for any clinical emergency  Red Alert: Provider response time of 1 hr required for any red alert requiring intervention  Yellow Alert: Provider response time of 3hr required for any escalated yellow alert    BP Triage  Are you having any Chest Pain? no   Are you having any Shortness of Breath? no   Do you have a headache or have any vision changes? no   Are you having any numbness or tingling? no   Are you having any other health concerns or issues? no        Clinical Interventions: Reviewed and followed up on alerts and treatments-spoke to patient she denies chest pain, SOB, dizziness, headache vision issues, patient states she feels fine she has not yet taken her morning medications and she states she did et a can of beef stew last evening that was probably high in sodium, patient will take her morning medications then recheck BP in 1 hour has no concerns    Plan/Follow Up: Will continue to review, monitor and address alerts with follow up based on severity of symptoms and risk factors.

## 2024-03-11 ENCOUNTER — CARE COORDINATION (OUTPATIENT)
Dept: CARE COORDINATION | Age: 79
End: 2024-03-11

## 2024-03-11 NOTE — CARE COORDINATION
Attempted care management follow up. No answer and no VM    Plan -   Working toward graduation RPM and ACM  FBS daily  Walk daily   Info sent for Silver Sneakers  Early symptom recognition and reporting to prevent ED and admissions  Use same or next day appts at PCP office for non-emergency problems  RPM for DM and HTN enrolled

## 2024-03-14 ENCOUNTER — CARE COORDINATION (OUTPATIENT)
Dept: CARE COORDINATION | Age: 79
End: 2024-03-14

## 2024-03-14 NOTE — CARE COORDINATION
Date/Time:  3/14/2024 11:26 AM  LPN attempted to reach patient by telephone regarding red alert in remote patient monitoring program. Voice  mailbox full and unable to leave HIPPA compliant message requesting a return call. Will attempt to reach patient again.

## 2024-03-14 NOTE — CARE COORDINATION
Remote Alert Monitoring Note  Rpm alert to be reviewed by the provider   red alert   weight (increase of 5 lbs x 7 days)   Additional needs to be addressed by N/A: No      Date/Time:  3/14/2024 4:14 PM  Patient Current Location: [place]  Background: Pt enrolled in RPM r/t HTN, DM.  Clinical Interventions: Reviewed and followed up on alerts and treatments-Multiple attempts to reach pt today to review RPM alert with no return call received as of this time.  Will route to [facility] to update.   Plan/Follow Up: Will continue to review, monitor and address alerts with follow up based on severity of symptoms and risk factors.

## 2024-03-14 NOTE — CARE COORDINATION
03/14/24 2:16 PM Patient is currently enrolled in Remote Patient Monitoring for Diabetes and HTN.  Second attempt to reach pt to review RPM red alert with no answer and voice mail full.  Outreach to emergency contact, Zo Vicente who will reach out to pt and request she return call.

## 2024-03-15 ENCOUNTER — CARE COORDINATION (OUTPATIENT)
Dept: CARE COORDINATION | Age: 79
End: 2024-03-15

## 2024-03-15 NOTE — CARE COORDINATION
Remote Alert Monitoring Note  Rpm alert to be reviewed by the provider   red alert   weight (increase of 5 lbs x 7 days)   Additional needs to be addressed by N/A: No      Date/Time:  3/15/2024 10:58 AM  Patient Current Location: [facility] contacted patient by telephone. Verified patients name and  as identifiers.  Background: Pt enrolled in RPM r/t HTN, DM.  Refer to 911 immediately if:  Patient unresponsive or unable to provide history  Change in cognition or sudden confusion  Patient unable to respond in complete sentences  Intense chest pain/tightness  Any concern for any clinical emergency  Red Alert: Provider response time of 1 hr required for any red alert requiring intervention  Yellow Alert: Provider response time of 3hr required for any escalated yellow alert  Weight Scale Triage  Was your weight obtained upon rising/waking today? yes   Was your weight obtained after voiding and/or use of the bathroom today? yes   Did you weigh yourself in the same amount of clothing today, compared to how you typically do? no   Was the scale bumped or moved prior to today's weight? no   Is your scale on a flat/hard surface? yes   Did you obtain your weight with shoes on? no   If yes, is this something you normally do during your daily weights? NA   Were you standing up straight on the scale today? yes   Were you leaning on anything while obtaining your weight today? no   Clinical Interventions: Reviewed and followed up on alerts and treatments-Spoke with pt who denies any new/increasing edema.  States she has been eating a lot lately, but has also forgotten to weigh prior to dressing both yesterday and today.  Pt education provided to obtain weight upon rising, after voiding and prior to dressing.  Pt verbalizes understanding.    Plan/Follow Up: Will continue to review, monitor and address alerts with follow up based on severity of symptoms and risk factors.

## 2024-03-18 ENCOUNTER — CARE COORDINATION (OUTPATIENT)
Dept: CARE COORDINATION | Age: 79
End: 2024-03-18

## 2024-03-18 NOTE — CARE COORDINATION
Goals Addressed                   This Visit's Progress     COMPLETED: Conditions and Symptoms        I will schedule office visits, as directed by my provider.  I will keep my appointment or reschedule if I have to cancel.  I will notify my provider of any barriers to my plan of care.  I will follow my Zone Management tool to seek urgent or emergent care.  I will notify my provider of any symptoms that indicate a worsening of my condition.    Barriers: impairment:  mental health: depression, financial, lack of support, and overwhelmed by complexity of regimen  Plan for overcoming my barriers: care coordination  Confidence: 9/10  Anticipated Goal Completion Date: 9/20/2023                Future Appointments   Date Time Provider Department Center   5/22/2024  9:30 AM Ismael Teague MD [facility]   1/22/2025  9:15 AM Ismael Teague MD [facility]    and   Diabetes Assessment      Meal Planning: Avoidance of concentrated sweets   How often do you test your blood sugar?: Daily   Do you have barriers with adherence to non-pharmacologic self-management interventions? (Nutrition/Exercise/Self-Monitoring): No   Have you ever had to go to the ED for symptoms of low blood sugar?: No

## 2024-04-03 DIAGNOSIS — E11.65 TYPE 2 DIABETES MELLITUS WITH HYPERGLYCEMIA, WITHOUT LONG-TERM CURRENT USE OF INSULIN (HCC): ICD-10-CM

## 2024-04-03 RX ORDER — METFORMIN HYDROCHLORIDE 500 MG/1
1000 TABLET, EXTENDED RELEASE ORAL
Qty: 180 TABLET | Refills: 3 | Status: SHIPPED | OUTPATIENT
Start: 2024-04-03

## 2024-04-03 RX ORDER — PANTOPRAZOLE SODIUM 40 MG/1
TABLET, DELAYED RELEASE ORAL
Qty: 90 TABLET | Refills: 3 | Status: SHIPPED | OUTPATIENT
Start: 2024-04-03

## 2024-04-03 RX ORDER — BUSPIRONE HYDROCHLORIDE 15 MG/1
TABLET ORAL
Qty: 90 TABLET | Refills: 3 | Status: SHIPPED | OUTPATIENT
Start: 2024-04-03

## 2024-04-03 RX ORDER — DULOXETIN HYDROCHLORIDE 60 MG/1
CAPSULE, DELAYED RELEASE ORAL
Qty: 90 CAPSULE | Refills: 3 | Status: SHIPPED | OUTPATIENT
Start: 2024-04-03

## 2024-04-03 RX ORDER — AMLODIPINE BESYLATE 5 MG/1
TABLET ORAL
Qty: 90 TABLET | Refills: 3 | Status: SHIPPED | OUTPATIENT
Start: 2024-04-03

## 2024-04-03 RX ORDER — DULAGLUTIDE 0.75 MG/.5ML
INJECTION, SOLUTION SUBCUTANEOUS
Qty: 4 ADJUSTABLE DOSE PRE-FILLED PEN SYRINGE | Refills: 2 | Status: SHIPPED | OUTPATIENT
Start: 2024-04-03

## 2024-04-11 NOTE — CARE COORDINATION
Attempted to reach patient for continued Care Coordination follow up and education. Patient was unavailable at the time of my call, and mailbox is full.
The patient has a h/o of type 2 diabetes, on insulin. Home meds include: lantus 22U at bedtime, humalog 6 units q24, and metformin 1000mg PO BID.  - Start Lantus 17U (80% home dose) at bedtime 4/9  - mISS  - A1c in AM labs  - Consistent carb diet  - Monitor FSG

## 2024-06-01 ENCOUNTER — NURSE ONLY (OUTPATIENT)
Dept: LAB | Age: 79
End: 2024-06-01

## 2024-06-01 DIAGNOSIS — E11.65 TYPE 2 DIABETES MELLITUS WITH HYPERGLYCEMIA, WITHOUT LONG-TERM CURRENT USE OF INSULIN (HCC): ICD-10-CM

## 2024-06-01 LAB
ALBUMIN SERPL BCG-MCNC: 4 G/DL (ref 3.5–5.1)
ALP SERPL-CCNC: 121 U/L (ref 38–126)
ALT SERPL W/O P-5'-P-CCNC: 24 U/L (ref 11–66)
ANION GAP SERPL CALC-SCNC: 12 MEQ/L (ref 8–16)
AST SERPL-CCNC: 17 U/L (ref 5–40)
BILIRUB SERPL-MCNC: 0.3 MG/DL (ref 0.3–1.2)
BUN SERPL-MCNC: 17 MG/DL (ref 7–22)
CALCIUM SERPL-MCNC: 9.2 MG/DL (ref 8.5–10.5)
CHLORIDE SERPL-SCNC: 101 MEQ/L (ref 98–111)
CO2 SERPL-SCNC: 29 MEQ/L (ref 23–33)
CREAT SERPL-MCNC: 0.9 MG/DL (ref 0.4–1.2)
GFR SERPL CREATININE-BSD FRML MDRD: 66 ML/MIN/1.73M2
GLUCOSE SERPL-MCNC: 257 MG/DL (ref 70–108)
POTASSIUM SERPL-SCNC: 4.3 MEQ/L (ref 3.5–5.2)
PROT SERPL-MCNC: 6.7 G/DL (ref 6.1–8)
SODIUM SERPL-SCNC: 142 MEQ/L (ref 135–145)

## 2024-06-02 LAB
DEPRECATED MEAN GLUCOSE BLD GHB EST-ACNC: 249 MG/DL (ref 70–126)
HBA1C MFR BLD HPLC: 10.3 % (ref 4.4–6.4)

## 2024-06-04 ENCOUNTER — OFFICE VISIT (OUTPATIENT)
Dept: FAMILY MEDICINE CLINIC | Age: 79
End: 2024-06-04

## 2024-06-04 VITALS
WEIGHT: 149 LBS | SYSTOLIC BLOOD PRESSURE: 118 MMHG | HEART RATE: 54 BPM | TEMPERATURE: 98.2 F | DIASTOLIC BLOOD PRESSURE: 60 MMHG | RESPIRATION RATE: 16 BRPM | BODY MASS INDEX: 24.79 KG/M2

## 2024-06-04 DIAGNOSIS — E11.65 TYPE 2 DIABETES MELLITUS WITH HYPERGLYCEMIA, WITHOUT LONG-TERM CURRENT USE OF INSULIN (HCC): Primary | ICD-10-CM

## 2024-06-04 DIAGNOSIS — E78.5 HYPERLIPIDEMIA, UNSPECIFIED HYPERLIPIDEMIA TYPE: ICD-10-CM

## 2024-06-04 DIAGNOSIS — F33.42 MAJOR DEPRESSIVE DISORDER, RECURRENT, IN FULL REMISSION (HCC): ICD-10-CM

## 2024-06-04 DIAGNOSIS — I10 PRIMARY HYPERTENSION: ICD-10-CM

## 2024-06-04 RX ORDER — DULAGLUTIDE 1.5 MG/.5ML
1.5 INJECTION, SOLUTION SUBCUTANEOUS WEEKLY
Qty: 4 ADJUSTABLE DOSE PRE-FILLED PEN SYRINGE | Refills: 5 | Status: SHIPPED | OUTPATIENT
Start: 2024-06-04

## 2024-06-26 DIAGNOSIS — E11.65 TYPE 2 DIABETES MELLITUS WITH HYPERGLYCEMIA, WITHOUT LONG-TERM CURRENT USE OF INSULIN (HCC): ICD-10-CM

## 2024-06-27 RX ORDER — DULAGLUTIDE 0.75 MG/.5ML
INJECTION, SOLUTION SUBCUTANEOUS
Qty: 4 ML | Refills: 0 | OUTPATIENT
Start: 2024-06-27

## 2024-07-12 ENCOUNTER — OFFICE VISIT (OUTPATIENT)
Dept: FAMILY MEDICINE CLINIC | Age: 79
End: 2024-07-12
Payer: MEDICARE

## 2024-07-12 VITALS
TEMPERATURE: 98.4 F | RESPIRATION RATE: 16 BRPM | SYSTOLIC BLOOD PRESSURE: 90 MMHG | HEART RATE: 84 BPM | OXYGEN SATURATION: 95 % | DIASTOLIC BLOOD PRESSURE: 60 MMHG | BODY MASS INDEX: 23.96 KG/M2 | WEIGHT: 144 LBS

## 2024-07-12 DIAGNOSIS — F33.1 MODERATE EPISODE OF RECURRENT MAJOR DEPRESSIVE DISORDER (HCC): Primary | ICD-10-CM

## 2024-07-12 PROCEDURE — 3078F DIAST BP <80 MM HG: CPT | Performed by: FAMILY MEDICINE

## 2024-07-12 PROCEDURE — 1123F ACP DISCUSS/DSCN MKR DOCD: CPT | Performed by: FAMILY MEDICINE

## 2024-07-12 PROCEDURE — 3074F SYST BP LT 130 MM HG: CPT | Performed by: FAMILY MEDICINE

## 2024-07-12 PROCEDURE — 99213 OFFICE O/P EST LOW 20 MIN: CPT | Performed by: FAMILY MEDICINE

## 2024-07-12 RX ORDER — DULOXETIN HYDROCHLORIDE 60 MG/1
120 CAPSULE, DELAYED RELEASE ORAL DAILY
Qty: 180 CAPSULE | Refills: 3 | Status: SHIPPED | OUTPATIENT
Start: 2024-07-12

## 2024-07-13 ASSESSMENT — ENCOUNTER SYMPTOMS
SORE THROAT: 0
CHEST TIGHTNESS: 0
VOMITING: 0
DIARRHEA: 0
SHORTNESS OF BREATH: 0
BACK PAIN: 0
COUGH: 0
RHINORRHEA: 0
ABDOMINAL PAIN: 0
EYES NEGATIVE: 1
NAUSEA: 0

## 2024-07-13 NOTE — PROGRESS NOTES
Does not apply route daily 100 each 3    Alcohol Swabs (ALCOHOL PADS) 70 % PADS Use daily 100 each 3     No current facility-administered medications for this visit.     Allergies   Allergen Reactions    Lisinopril Other (See Comments)     Dry mouth    Sulfa Antibiotics Hives and Other (See Comments)     hypotension     Health Maintenance   Topic Date Due    Flu vaccine (1) 08/01/2024    Lipids  09/12/2024    Depression Monitoring  01/19/2025    DTaP/Tdap/Td vaccine (2 - Td or Tdap) 01/08/2026    DEXA (modify frequency per FRAX score)  Completed    Annual Wellness Visit (Medicare Advantage)  Completed    Shingles vaccine  Completed    Pneumococcal 65+ years Vaccine  Completed    COVID-19 Vaccine  Completed    Respiratory Syncytial Virus (RSV) Pregnant or age 60 yrs+  Completed    Hepatitis C screen  Completed    Hepatitis A vaccine  Aged Out    Hepatitis B vaccine  Aged Out    Hib vaccine  Aged Out    Polio vaccine  Aged Out    Meningococcal (ACWY) vaccine  Aged Out    A1C test (Diabetic or Prediabetic)  Discontinued    Diabetic Alb to Cr ratio (uACR) test  Discontinued    Diabetic retinal exam  Discontinued    Breast cancer screen  Discontinued    Colorectal Cancer Screen  Discontinued         Objective:  Assessment & Plan   Physical Exam  Constitutional:       General: She is not in acute distress.     Appearance: Normal appearance. She is well-developed. She is not diaphoretic.   HENT:      Head: Normocephalic and atraumatic.      Right Ear: External ear normal.      Left Ear: External ear normal.      Nose: Nose normal.      Mouth/Throat:      Pharynx: Oropharynx is clear.   Eyes:      General: No scleral icterus.     Conjunctiva/sclera: Conjunctivae normal.   Neck:      Thyroid: No thyromegaly.      Vascular: No JVD.      Comments: No bruits  Cardiovascular:      Rate and Rhythm: Normal rate and regular rhythm.      Heart sounds: Normal heart sounds.   Pulmonary:      Effort: Pulmonary effort is normal. No

## 2024-08-27 RX ORDER — GLIMEPIRIDE 4 MG/1
4 TABLET ORAL 2 TIMES DAILY WITH MEALS
Qty: 180 TABLET | Refills: 0 | Status: SHIPPED | OUTPATIENT
Start: 2024-08-27

## 2024-09-03 ENCOUNTER — LAB (OUTPATIENT)
Dept: LAB | Age: 79
End: 2024-09-03

## 2024-09-03 DIAGNOSIS — E11.65 TYPE 2 DIABETES MELLITUS WITH HYPERGLYCEMIA, WITHOUT LONG-TERM CURRENT USE OF INSULIN (HCC): ICD-10-CM

## 2024-09-03 LAB
ALBUMIN SERPL BCG-MCNC: 4.2 G/DL (ref 3.5–5.1)
ALP SERPL-CCNC: 118 U/L (ref 38–126)
ALT SERPL W/O P-5'-P-CCNC: 16 U/L (ref 11–66)
ANION GAP SERPL CALC-SCNC: 12 MEQ/L (ref 8–16)
AST SERPL-CCNC: 14 U/L (ref 5–40)
BILIRUB SERPL-MCNC: 0.6 MG/DL (ref 0.3–1.2)
BUN SERPL-MCNC: 14 MG/DL (ref 7–22)
CALCIUM SERPL-MCNC: 9.3 MG/DL (ref 8.5–10.5)
CHLORIDE SERPL-SCNC: 99 MEQ/L (ref 98–111)
CHOLEST SERPL-MCNC: 234 MG/DL (ref 100–199)
CO2 SERPL-SCNC: 25 MEQ/L (ref 23–33)
CREAT SERPL-MCNC: 0.7 MG/DL (ref 0.4–1.2)
CREAT UR-MCNC: 44.6 MG/DL
DEPRECATED MEAN GLUCOSE BLD GHB EST-ACNC: 258 MG/DL (ref 70–126)
GFR SERPL CREATININE-BSD FRML MDRD: 89 ML/MIN/1.73M2
GLUCOSE SERPL-MCNC: 304 MG/DL (ref 70–108)
HBA1C MFR BLD HPLC: 10.6 % (ref 4.4–6.4)
HDLC SERPL-MCNC: 55 MG/DL
LDLC SERPL CALC-MCNC: 151 MG/DL
MICROALBUMIN UR-MCNC: < 1.2 MG/DL
MICROALBUMIN/CREAT RATIO PNL UR: 27 MG/G (ref 0–30)
POTASSIUM SERPL-SCNC: 4.5 MEQ/L (ref 3.5–5.2)
PROT SERPL-MCNC: 6.9 G/DL (ref 6.1–8)
SODIUM SERPL-SCNC: 136 MEQ/L (ref 135–145)
TRIGL SERPL-MCNC: 140 MG/DL (ref 0–199)

## 2024-09-12 ENCOUNTER — OFFICE VISIT (OUTPATIENT)
Dept: FAMILY MEDICINE CLINIC | Age: 79
End: 2024-09-12

## 2024-09-12 VITALS
TEMPERATURE: 98.1 F | OXYGEN SATURATION: 96 % | SYSTOLIC BLOOD PRESSURE: 88 MMHG | BODY MASS INDEX: 23.63 KG/M2 | HEART RATE: 90 BPM | WEIGHT: 142 LBS | DIASTOLIC BLOOD PRESSURE: 60 MMHG | RESPIRATION RATE: 16 BRPM

## 2024-09-12 DIAGNOSIS — I10 PRIMARY HYPERTENSION: ICD-10-CM

## 2024-09-12 DIAGNOSIS — F33.1 MODERATE EPISODE OF RECURRENT MAJOR DEPRESSIVE DISORDER (HCC): ICD-10-CM

## 2024-09-12 DIAGNOSIS — E78.5 HYPERLIPIDEMIA, UNSPECIFIED HYPERLIPIDEMIA TYPE: ICD-10-CM

## 2024-09-12 DIAGNOSIS — E11.65 TYPE 2 DIABETES MELLITUS WITH HYPERGLYCEMIA, WITHOUT LONG-TERM CURRENT USE OF INSULIN (HCC): Primary | ICD-10-CM

## 2024-09-12 RX ORDER — QUETIAPINE FUMARATE 25 MG/1
25 TABLET, FILM COATED ORAL NIGHTLY
Qty: 30 TABLET | Refills: 2 | Status: SHIPPED | OUTPATIENT
Start: 2024-09-12

## 2024-09-12 ASSESSMENT — ENCOUNTER SYMPTOMS
EYES NEGATIVE: 1
VOMITING: 0
SHORTNESS OF BREATH: 0
ABDOMINAL PAIN: 0
SORE THROAT: 0
CHEST TIGHTNESS: 0
DIARRHEA: 0
NAUSEA: 0
BACK PAIN: 0
COUGH: 0
RHINORRHEA: 0

## 2024-10-06 ENCOUNTER — PATIENT MESSAGE (OUTPATIENT)
Dept: FAMILY MEDICINE CLINIC | Age: 79
End: 2024-10-06

## 2024-10-07 NOTE — TELEPHONE ENCOUNTER
Inform patient that her dose is low enough that it is okay to just discontinue taking it, no need to taper off

## 2024-10-31 ENCOUNTER — OFFICE VISIT (OUTPATIENT)
Dept: FAMILY MEDICINE CLINIC | Age: 79
End: 2024-10-31

## 2024-10-31 VITALS
SYSTOLIC BLOOD PRESSURE: 124 MMHG | WEIGHT: 140.2 LBS | RESPIRATION RATE: 24 BRPM | HEART RATE: 88 BPM | BODY MASS INDEX: 22.53 KG/M2 | HEIGHT: 66 IN | DIASTOLIC BLOOD PRESSURE: 72 MMHG

## 2024-10-31 DIAGNOSIS — E11.65 TYPE 2 DIABETES MELLITUS WITH HYPERGLYCEMIA, WITHOUT LONG-TERM CURRENT USE OF INSULIN (HCC): Primary | ICD-10-CM

## 2024-10-31 DIAGNOSIS — I10 PRIMARY HYPERTENSION: ICD-10-CM

## 2024-10-31 DIAGNOSIS — Z23 NEEDS FLU SHOT: ICD-10-CM

## 2024-10-31 DIAGNOSIS — F33.1 MODERATE EPISODE OF RECURRENT MAJOR DEPRESSIVE DISORDER (HCC): ICD-10-CM

## 2024-10-31 ASSESSMENT — ENCOUNTER SYMPTOMS
CHEST TIGHTNESS: 0
EYES NEGATIVE: 1
COUGH: 0
BACK PAIN: 0
NAUSEA: 0
SHORTNESS OF BREATH: 0
ABDOMINAL PAIN: 0
DIARRHEA: 0
VOMITING: 0
RHINORRHEA: 0
SORE THROAT: 0

## 2024-10-31 NOTE — PROGRESS NOTES
distress.     Appearance: Normal appearance. She is well-developed. She is not diaphoretic.   HENT:      Head: Normocephalic and atraumatic.      Right Ear: External ear normal.      Left Ear: External ear normal.      Nose: Nose normal.      Mouth/Throat:      Pharynx: Oropharynx is clear.   Eyes:      General: No scleral icterus.     Conjunctiva/sclera: Conjunctivae normal.   Neck:      Thyroid: No thyromegaly.      Vascular: No JVD.      Comments: No bruits  Cardiovascular:      Rate and Rhythm: Normal rate and regular rhythm.      Heart sounds: Normal heart sounds.   Pulmonary:      Effort: Pulmonary effort is normal. No respiratory distress.      Breath sounds: Normal breath sounds. No wheezing or rales.   Abdominal:      Palpations: Abdomen is soft. There is no mass.      Tenderness: There is no abdominal tenderness. There is no guarding.   Musculoskeletal:         General: No tenderness.   Skin:     General: Skin is warm and dry.      Findings: No rash.   Neurological:      Mental Status: She is alert and oriented to person, place, and time. Mental status is at baseline.      Cranial Nerves: No cranial nerve deficit.   Psychiatric:         Mood and Affect: Mood normal.       /72   Pulse 88   Resp 24   Ht 1.664 m (5' 5.5\")   Wt 63.6 kg (140 lb 3.2 oz)   BMI 22.98 kg/m²       Patient giveneducational materials - see patient instructions.  Discussed use, benefit, and side effects of prescribed medications.  All patient questions answered.  Pt voiced understanding. Reviewed health maintenance. Patient agreedwith treatment plan. Follow up as directed.   PDMP Monitoring:    Last PDMP Ismael as Reviewed:  Review User Review Instant Review Result            Urine Drug Screenings (1 yr)    No resulted procedures found.       Medication Contract and Consent for Opioid Use Documents Filed        No documents found                   **This report has been created using voice recognition software. It may contain

## 2024-12-20 ENCOUNTER — HOSPITAL ENCOUNTER (OUTPATIENT)
Dept: WOMENS IMAGING | Age: 79
Discharge: HOME | End: 2024-12-20
Payer: MEDICARE

## 2024-12-20 DIAGNOSIS — Z12.31 VISIT FOR SCREENING MAMMOGRAM: ICD-10-CM

## 2024-12-20 PROCEDURE — 77063 BREAST TOMOSYNTHESIS BI: CPT

## 2024-12-20 RX ORDER — GLIMEPIRIDE 4 MG/1
4 TABLET ORAL 2 TIMES DAILY WITH MEALS
Qty: 180 TABLET | Refills: 0 | Status: SHIPPED | OUTPATIENT
Start: 2024-12-20

## 2025-01-08 ENCOUNTER — LAB (OUTPATIENT)
Dept: LAB | Age: 80
End: 2025-01-08

## 2025-01-08 DIAGNOSIS — E78.5 HYPERLIPIDEMIA, UNSPECIFIED HYPERLIPIDEMIA TYPE: ICD-10-CM

## 2025-01-08 DIAGNOSIS — E11.65 TYPE 2 DIABETES MELLITUS WITH HYPERGLYCEMIA, WITHOUT LONG-TERM CURRENT USE OF INSULIN (HCC): ICD-10-CM

## 2025-01-08 LAB
ALBUMIN SERPL BCG-MCNC: 4.2 G/DL (ref 3.5–5.1)
ALP SERPL-CCNC: 80 U/L (ref 38–126)
ALT SERPL W/O P-5'-P-CCNC: 12 U/L (ref 11–66)
ANION GAP SERPL CALC-SCNC: 14 MEQ/L (ref 8–16)
AST SERPL-CCNC: 15 U/L (ref 5–40)
BILIRUB SERPL-MCNC: 0.8 MG/DL (ref 0.3–1.2)
BUN SERPL-MCNC: 20 MG/DL (ref 7–22)
CALCIUM SERPL-MCNC: 9.1 MG/DL (ref 8.5–10.5)
CHLORIDE SERPL-SCNC: 101 MEQ/L (ref 98–111)
CHOLEST SERPL-MCNC: 159 MG/DL (ref 100–199)
CO2 SERPL-SCNC: 28 MEQ/L (ref 23–33)
CREAT SERPL-MCNC: 0.9 MG/DL (ref 0.4–1.2)
DEPRECATED MEAN GLUCOSE BLD GHB EST-ACNC: 189 MG/DL (ref 70–126)
GFR SERPL CREATININE-BSD FRML MDRD: 66 ML/MIN/1.73M2
GLUCOSE SERPL-MCNC: 187 MG/DL (ref 70–108)
HBA1C MFR BLD HPLC: 8.3 % (ref 4.4–6.4)
HDLC SERPL-MCNC: 52 MG/DL
LDLC SERPL CALC-MCNC: 86 MG/DL
POTASSIUM SERPL-SCNC: 3.2 MEQ/L (ref 3.5–5.2)
PROT SERPL-MCNC: 6.7 G/DL (ref 6.1–8)
SODIUM SERPL-SCNC: 143 MEQ/L (ref 135–145)
TRIGL SERPL-MCNC: 107 MG/DL (ref 0–199)

## 2025-01-22 ENCOUNTER — OFFICE VISIT (OUTPATIENT)
Dept: FAMILY MEDICINE CLINIC | Age: 80
End: 2025-01-22
Payer: MEDICARE

## 2025-01-22 VITALS
SYSTOLIC BLOOD PRESSURE: 120 MMHG | OXYGEN SATURATION: 98 % | HEART RATE: 78 BPM | BODY MASS INDEX: 23.16 KG/M2 | WEIGHT: 139 LBS | HEIGHT: 65 IN | TEMPERATURE: 98 F | DIASTOLIC BLOOD PRESSURE: 70 MMHG | RESPIRATION RATE: 16 BRPM

## 2025-01-22 DIAGNOSIS — F33.42 MAJOR DEPRESSIVE DISORDER, RECURRENT, IN FULL REMISSION (HCC): ICD-10-CM

## 2025-01-22 DIAGNOSIS — E11.65 TYPE 2 DIABETES MELLITUS WITH HYPERGLYCEMIA, WITHOUT LONG-TERM CURRENT USE OF INSULIN (HCC): ICD-10-CM

## 2025-01-22 DIAGNOSIS — E78.5 HYPERLIPIDEMIA, UNSPECIFIED HYPERLIPIDEMIA TYPE: ICD-10-CM

## 2025-01-22 DIAGNOSIS — I10 PRIMARY HYPERTENSION: ICD-10-CM

## 2025-01-22 DIAGNOSIS — Z00.00 MEDICARE ANNUAL WELLNESS VISIT, SUBSEQUENT: Primary | ICD-10-CM

## 2025-01-22 PROBLEM — F33.1 MODERATE EPISODE OF RECURRENT MAJOR DEPRESSIVE DISORDER (HCC): Status: ACTIVE | Noted: 2025-01-22

## 2025-01-22 PROCEDURE — 1160F RVW MEDS BY RX/DR IN RCRD: CPT | Performed by: FAMILY MEDICINE

## 2025-01-22 PROCEDURE — G0439 PPPS, SUBSEQ VISIT: HCPCS | Performed by: FAMILY MEDICINE

## 2025-01-22 PROCEDURE — 3078F DIAST BP <80 MM HG: CPT | Performed by: FAMILY MEDICINE

## 2025-01-22 PROCEDURE — 1159F MED LIST DOCD IN RCRD: CPT | Performed by: FAMILY MEDICINE

## 2025-01-22 PROCEDURE — 3074F SYST BP LT 130 MM HG: CPT | Performed by: FAMILY MEDICINE

## 2025-01-22 PROCEDURE — 3052F HG A1C>EQUAL 8.0%<EQUAL 9.0%: CPT | Performed by: FAMILY MEDICINE

## 2025-01-22 PROCEDURE — 1123F ACP DISCUSS/DSCN MKR DOCD: CPT | Performed by: FAMILY MEDICINE

## 2025-01-22 RX ORDER — ATORVASTATIN CALCIUM 20 MG/1
TABLET, FILM COATED ORAL
Qty: 90 TABLET | Refills: 3 | Status: SHIPPED | OUTPATIENT
Start: 2025-01-22

## 2025-01-22 RX ORDER — GLIMEPIRIDE 4 MG/1
4 TABLET ORAL 2 TIMES DAILY WITH MEALS
Qty: 180 TABLET | Refills: 0 | Status: SHIPPED | OUTPATIENT
Start: 2025-01-22

## 2025-01-22 RX ORDER — LOSARTAN POTASSIUM 50 MG/1
TABLET ORAL
Qty: 90 TABLET | Refills: 3 | Status: SHIPPED | OUTPATIENT
Start: 2025-01-22

## 2025-01-22 SDOH — ECONOMIC STABILITY: FOOD INSECURITY: WITHIN THE PAST 12 MONTHS, YOU WORRIED THAT YOUR FOOD WOULD RUN OUT BEFORE YOU GOT MONEY TO BUY MORE.: NEVER TRUE

## 2025-01-22 SDOH — ECONOMIC STABILITY: FOOD INSECURITY: WITHIN THE PAST 12 MONTHS, THE FOOD YOU BOUGHT JUST DIDN'T LAST AND YOU DIDN'T HAVE MONEY TO GET MORE.: NEVER TRUE

## 2025-01-22 ASSESSMENT — COLUMBIA-SUICIDE SEVERITY RATING SCALE - C-SSRS
6. HAVE YOU EVER DONE ANYTHING, STARTED TO DO ANYTHING, OR PREPARED TO DO ANYTHING TO END YOUR LIFE?: NO
2. HAVE YOU ACTUALLY HAD ANY THOUGHTS OF KILLING YOURSELF?: NO
1. WITHIN THE PAST MONTH, HAVE YOU WISHED YOU WERE DEAD OR WISHED YOU COULD GO TO SLEEP AND NOT WAKE UP?: NO

## 2025-01-22 ASSESSMENT — PATIENT HEALTH QUESTIONNAIRE - PHQ9
5. POOR APPETITE OR OVEREATING: NOT AT ALL
3. TROUBLE FALLING OR STAYING ASLEEP: NEARLY EVERY DAY
SUM OF ALL RESPONSES TO PHQ9 QUESTIONS 1 & 2: 3
SUM OF ALL RESPONSES TO PHQ QUESTIONS 1-9: 10
1. LITTLE INTEREST OR PLEASURE IN DOING THINGS: MORE THAN HALF THE DAYS
SUM OF ALL RESPONSES TO PHQ QUESTIONS 1-9: 10
7. TROUBLE CONCENTRATING ON THINGS, SUCH AS READING THE NEWSPAPER OR WATCHING TELEVISION: SEVERAL DAYS
SUM OF ALL RESPONSES TO PHQ QUESTIONS 1-9: 10
9. THOUGHTS THAT YOU WOULD BE BETTER OFF DEAD, OR OF HURTING YOURSELF: NOT AT ALL
6. FEELING BAD ABOUT YOURSELF - OR THAT YOU ARE A FAILURE OR HAVE LET YOURSELF OR YOUR FAMILY DOWN: SEVERAL DAYS
8. MOVING OR SPEAKING SO SLOWLY THAT OTHER PEOPLE COULD HAVE NOTICED. OR THE OPPOSITE, BEING SO FIGETY OR RESTLESS THAT YOU HAVE BEEN MOVING AROUND A LOT MORE THAN USUAL: NOT AT ALL
2. FEELING DOWN, DEPRESSED OR HOPELESS: SEVERAL DAYS
SUM OF ALL RESPONSES TO PHQ QUESTIONS 1-9: 10
4. FEELING TIRED OR HAVING LITTLE ENERGY: MORE THAN HALF THE DAYS
10. IF YOU CHECKED OFF ANY PROBLEMS, HOW DIFFICULT HAVE THESE PROBLEMS MADE IT FOR YOU TO DO YOUR WORK, TAKE CARE OF THINGS AT HOME, OR GET ALONG WITH OTHER PEOPLE: NOT DIFFICULT AT ALL

## 2025-01-23 NOTE — PROGRESS NOTES
distress  Skin: warm and dry, no rash or erythema  Head: normocephalic and atraumatic  Eyes: pupils equal, round, and reactive to light, extraocular eye movements intact, conjunctivae normal  ENT: tympanic membrane, external ear and ear canal normal bilaterally, nose without deformity, nasal mucosa and turbinates normal without polyps  Neck: supple and non-tender without mass, no thyromegaly or thyroid nodules, no cervical lymphadenopathy  Pulmonary/Chest: clear to auscultation bilaterally- no wheezes, rales or rhonchi, normal air movement, no respiratory distress  Cardiovascular: normal rate, regular rhythm, normal S1 and S2, no murmurs, rubs, clicks, or gallops, distal pulses intact, no carotid bruits  Abdomen: soft, non-tender, non-distended, normal bowel sounds, no masses or organomegaly  Extremities: no cyanosis, clubbing or edema  Musculoskeletal: normal range of motion, no joint swelling, deformity or tenderness  Neurologic: reflexes normal and symmetric, no cranial nerve deficit, gait, coordination and speech normal            Allergies   Allergen Reactions    Lisinopril Other (See Comments)     Dry mouth    Sulfa Antibiotics Hives and Other (See Comments)     hypotension     Prior to Visit Medications    Medication Sig Taking? Authorizing Provider   atorvastatin (LIPITOR) 20 MG tablet Take 1 tablet by mouth once daily Yes Ismael Teague MD   glimepiride (AMARYL) 4 MG tablet Take 1 tablet by mouth with breakfast and with evening meal Yes Ismael Teague MD   losartan (COZAAR) 50 MG tablet Take 1 tablet by mouth once daily Yes Ismael Teague MD   Dulaglutide 3 MG/0.5ML SOPN Inject 3 mg into the skin once a week Yes Ismael Teague MD   DULoxetine (CYMBALTA) 60 MG extended release capsule Take 2 capsules by mouth daily Yes Ismael Teague MD   busPIRone (BUSPAR) 15 MG tablet Take 1 tablet by mouth once daily Yes Ismael Teague MD   empagliflozin (JARDIANCE) 25 MG tablet Take 1 tablet by mouth daily

## 2025-01-23 NOTE — PATIENT INSTRUCTIONS
peanut, or canola oil when you cook. Bake, broil, and steam foods instead of frying them.     Eat a variety of fruit and vegetables every day. Dark green, deep orange, red, or yellow fruits and vegetables are especially good for you. Examples include spinach, carrots, peaches, and berries.     Foods high in fiber can reduce your cholesterol and provide important vitamins and minerals. High-fiber foods include whole-grain cereals and breads, oatmeal, beans, brown rice, citrus fruits, and apples.     Eat lean proteins. Heart-healthy proteins include seafood, lean meats and poultry, eggs, beans, peas, nuts, seeds, and soy products.     Limit drinks and foods with added sugar. These include candy, desserts, and soda pop.   Heart-healthy lifestyle    If your doctor recommends it, get more exercise. For many people, walking is a good choice. Or you may want to swim, bike, or do other activities. Bit by bit, increase the time you're active every day. Try for at least 30 minutes on most days of the week.     Try to quit or cut back on using tobacco and other nicotine products. This includes smoking and vaping. If you need help quitting, talk to your doctor about stop-smoking programs and medicines. These can increase your chances of quitting for good. Quitting is one of the most important things you can do to protect your heart. It is never too late to quit. Try to avoid secondhand smoke too.     Stay at a weight that's healthy for you. Talk to your doctor if you need help losing weight.     Try to get 7 to 9 hours of sleep each night.     Limit alcohol to 2 drinks a day for men and 1 drink a day for women. Too much alcohol can cause health problems.     Manage other health problems such as diabetes, high blood pressure, and high cholesterol. If you think you may have a problem with alcohol or drug use, talk to your doctor.   Medicines    Take your medicines exactly as prescribed. Call your doctor if you think you are

## 2025-04-04 DIAGNOSIS — E11.65 TYPE 2 DIABETES MELLITUS WITH HYPERGLYCEMIA, WITHOUT LONG-TERM CURRENT USE OF INSULIN (HCC): ICD-10-CM

## 2025-04-04 RX ORDER — EMPAGLIFLOZIN 25 MG/1
25 TABLET, FILM COATED ORAL DAILY
Qty: 90 TABLET | Refills: 0 | Status: SHIPPED | OUTPATIENT
Start: 2025-04-04

## 2025-04-04 RX ORDER — GLIMEPIRIDE 4 MG/1
4 TABLET ORAL 2 TIMES DAILY WITH MEALS
Qty: 180 TABLET | Refills: 0 | Status: SHIPPED | OUTPATIENT
Start: 2025-04-04

## 2025-04-07 RX ORDER — BUSPIRONE HYDROCHLORIDE 15 MG/1
15 TABLET ORAL DAILY
Qty: 90 TABLET | Refills: 0 | Status: SHIPPED | OUTPATIENT
Start: 2025-04-07

## 2025-04-26 ENCOUNTER — LAB (OUTPATIENT)
Dept: LAB | Age: 80
End: 2025-04-26

## 2025-04-26 DIAGNOSIS — E11.65 TYPE 2 DIABETES MELLITUS WITH HYPERGLYCEMIA, WITHOUT LONG-TERM CURRENT USE OF INSULIN (HCC): ICD-10-CM

## 2025-04-26 LAB
ALBUMIN SERPL BCG-MCNC: 4.2 G/DL (ref 3.4–4.9)
ALP SERPL-CCNC: 80 U/L (ref 38–126)
ALT SERPL W/O P-5'-P-CCNC: 13 U/L (ref 10–35)
ANION GAP SERPL CALC-SCNC: 14 MEQ/L (ref 8–16)
AST SERPL-CCNC: 16 U/L (ref 10–35)
BILIRUB SERPL-MCNC: 0.8 MG/DL (ref 0.3–1.2)
BUN SERPL-MCNC: 15 MG/DL (ref 8–23)
CALCIUM SERPL-MCNC: 9.5 MG/DL (ref 8.8–10.2)
CHLORIDE SERPL-SCNC: 103 MEQ/L (ref 98–111)
CHOLEST SERPL-MCNC: 143 MG/DL (ref 100–199)
CO2 SERPL-SCNC: 26 MEQ/L (ref 22–29)
CREAT SERPL-MCNC: 0.8 MG/DL (ref 0.5–0.9)
CREAT UR-MCNC: 76.3 MG/DL
DEPRECATED MEAN GLUCOSE BLD GHB EST-ACNC: 225 MG/DL (ref 70–126)
GFR SERPL CREATININE-BSD FRML MDRD: 76 ML/MIN/1.73M2
GLUCOSE SERPL-MCNC: 170 MG/DL (ref 74–109)
HBA1C MFR BLD HPLC: 9.5 % (ref 4–6)
HDLC SERPL-MCNC: 48 MG/DL
LDLC SERPL CALC-MCNC: 77 MG/DL
MICROALBUMIN UR-MCNC: < 2 MG/DL
MICROALBUMIN/CREAT RATIO PNL UR: 26 MG/G (ref 0–30)
POTASSIUM SERPL-SCNC: 3.4 MEQ/L (ref 3.5–5.2)
PROT SERPL-MCNC: 6.6 G/DL (ref 6.4–8.3)
SODIUM SERPL-SCNC: 143 MEQ/L (ref 135–145)
TRIGL SERPL-MCNC: 92 MG/DL (ref 0–199)

## 2025-04-28 ENCOUNTER — RESULTS FOLLOW-UP (OUTPATIENT)
Dept: FAMILY MEDICINE CLINIC | Age: 80
End: 2025-04-28

## 2025-05-01 DIAGNOSIS — E11.65 TYPE 2 DIABETES MELLITUS WITH HYPERGLYCEMIA, WITHOUT LONG-TERM CURRENT USE OF INSULIN (HCC): ICD-10-CM

## 2025-05-01 RX ORDER — METFORMIN HYDROCHLORIDE 500 MG/1
TABLET, EXTENDED RELEASE ORAL
Qty: 180 TABLET | Refills: 2 | Status: SHIPPED | OUTPATIENT
Start: 2025-05-01

## 2025-05-05 ENCOUNTER — OFFICE VISIT (OUTPATIENT)
Dept: FAMILY MEDICINE CLINIC | Age: 80
End: 2025-05-05
Payer: MEDICARE

## 2025-05-05 VITALS
RESPIRATION RATE: 16 BRPM | SYSTOLIC BLOOD PRESSURE: 126 MMHG | HEART RATE: 86 BPM | BODY MASS INDEX: 22.67 KG/M2 | WEIGHT: 136.1 LBS | DIASTOLIC BLOOD PRESSURE: 66 MMHG | HEIGHT: 65 IN | OXYGEN SATURATION: 95 %

## 2025-05-05 DIAGNOSIS — F33.1 MODERATE EPISODE OF RECURRENT MAJOR DEPRESSIVE DISORDER (HCC): ICD-10-CM

## 2025-05-05 DIAGNOSIS — R41.3 MEMORY DEFICIT: Primary | ICD-10-CM

## 2025-05-05 DIAGNOSIS — E11.65 TYPE 2 DIABETES MELLITUS WITH HYPERGLYCEMIA, WITHOUT LONG-TERM CURRENT USE OF INSULIN (HCC): ICD-10-CM

## 2025-05-05 PROCEDURE — 3046F HEMOGLOBIN A1C LEVEL >9.0%: CPT | Performed by: FAMILY MEDICINE

## 2025-05-05 PROCEDURE — 99214 OFFICE O/P EST MOD 30 MIN: CPT | Performed by: FAMILY MEDICINE

## 2025-05-05 PROCEDURE — 1123F ACP DISCUSS/DSCN MKR DOCD: CPT | Performed by: FAMILY MEDICINE

## 2025-05-05 PROCEDURE — 1160F RVW MEDS BY RX/DR IN RCRD: CPT | Performed by: FAMILY MEDICINE

## 2025-05-05 PROCEDURE — 3078F DIAST BP <80 MM HG: CPT | Performed by: FAMILY MEDICINE

## 2025-05-05 PROCEDURE — 1159F MED LIST DOCD IN RCRD: CPT | Performed by: FAMILY MEDICINE

## 2025-05-05 PROCEDURE — 3074F SYST BP LT 130 MM HG: CPT | Performed by: FAMILY MEDICINE

## 2025-05-05 RX ORDER — BLOOD SUGAR DIAGNOSTIC
STRIP MISCELLANEOUS
Qty: 100 EACH | Refills: 3 | Status: SHIPPED | OUTPATIENT
Start: 2025-05-05

## 2025-05-05 RX ORDER — MOXIFLOXACIN 5 MG/ML
SOLUTION/ DROPS OPHTHALMIC
COMMUNITY
Start: 2025-04-02

## 2025-05-05 RX ORDER — PREDNISOLONE ACETATE 10 MG/ML
SUSPENSION/ DROPS OPHTHALMIC
COMMUNITY
Start: 2025-04-02

## 2025-05-06 ASSESSMENT — ENCOUNTER SYMPTOMS
VOMITING: 0
RHINORRHEA: 0
DIARRHEA: 0
EYES NEGATIVE: 1
NAUSEA: 0
CHEST TIGHTNESS: 0
SORE THROAT: 0
SHORTNESS OF BREATH: 0
ABDOMINAL PAIN: 0
COUGH: 0
BACK PAIN: 0

## 2025-05-06 NOTE — PROGRESS NOTES
[facility]  [facility]  [address] [address]  Dept: [phone]  Dept Fax: [phone]  Loc: [phone]  PROGRESS NOTE      VisitDate: 5/5/2025    Sissy Dominique is a 79 y.o. female who presents today for:  Chief Complaint   Patient presents with    3 Month Follow-Up     Discuss changing to a different antidepressant. Currently taking cymbalta and buspar.        Impression/Plan:  1. Memory deficit    2. Type 2 diabetes mellitus with hyperglycemia, without long-term current use of insulin (HCC)    3. Moderate episode of recurrent major depressive disorder (HCC)      Requested Prescriptions     Signed Prescriptions Disp Refills    blood glucose test strips (ONETOUCH VERIO) strip 100 each 3     Sig: Test Blood Sugar once a day  E11.65     Orders Placed This Encounter   Procedures    CBC with Auto Differential     Standing Status:   Future     Expected Date:   5/19/2025     Expiration Date:   5/5/2026    T4, Free     Standing Status:   Future     Expected Date:   5/19/2025     Expiration Date:   5/5/2026    TSH     Standing Status:   Future     Expected Date:   5/19/2025     Expiration Date:   5/5/2026    Vitamin B12 & Folate     Standing Status:   Future     Expected Date:   5/19/2025     Expiration Date:   5/5/2026    Hemoglobin A1C     Standing Status:   Future     Expected Date:   5/19/2025     Expiration Date:   5/5/2026         Subjective:  History of Present Illness  The patient is a 79-year-old female who presents for evaluation of depression, anxiety, diabetes, and headaches. She is accompanied by her son.    She reports persistent feelings of being overwhelmed, frequent crying episodes, and heightened anxiety. She also experiences memory lapses, sleep disturbances, and headaches. These symptoms have been gradually worsening over time. Her son corroborates her report of poor sleep quality and short-term memory issues that have developed

## 2025-05-12 RX ORDER — PANTOPRAZOLE SODIUM 40 MG/1
40 TABLET, DELAYED RELEASE ORAL DAILY
Qty: 90 TABLET | Refills: 3 | Status: SHIPPED | OUTPATIENT
Start: 2025-05-12

## 2025-05-29 ENCOUNTER — LAB (OUTPATIENT)
Dept: LAB | Age: 80
End: 2025-05-29

## 2025-05-29 DIAGNOSIS — R41.3 MEMORY DEFICIT: ICD-10-CM

## 2025-05-29 DIAGNOSIS — E11.65 TYPE 2 DIABETES MELLITUS WITH HYPERGLYCEMIA, WITHOUT LONG-TERM CURRENT USE OF INSULIN (HCC): ICD-10-CM

## 2025-05-29 LAB
BASOPHILS ABSOLUTE: 0 THOU/MM3 (ref 0–0.1)
BASOPHILS NFR BLD AUTO: 0.6 %
DEPRECATED MEAN GLUCOSE BLD GHB EST-ACNC: 213 MG/DL (ref 70–126)
DEPRECATED RDW RBC AUTO: 42.5 FL (ref 35–45)
EOSINOPHIL NFR BLD AUTO: 3 %
EOSINOPHILS ABSOLUTE: 0.2 THOU/MM3 (ref 0–0.4)
ERYTHROCYTE [DISTWIDTH] IN BLOOD BY AUTOMATED COUNT: 12.7 % (ref 11.5–14.5)
FOLATE SERPL-MCNC: 16.9 NG/ML (ref 4.6–34.8)
HBA1C MFR BLD HPLC: 9.1 % (ref 4–6)
HCT VFR BLD AUTO: 45.8 % (ref 37–47)
HGB BLD-MCNC: 15.4 GM/DL (ref 12–16)
IMM GRANULOCYTES # BLD AUTO: 0.01 THOU/MM3 (ref 0–0.07)
IMM GRANULOCYTES NFR BLD AUTO: 0.2 %
LYMPHOCYTES ABSOLUTE: 1.3 THOU/MM3 (ref 1–4.8)
LYMPHOCYTES NFR BLD AUTO: 20.4 %
MCH RBC QN AUTO: 30.9 PG (ref 26–33)
MCHC RBC AUTO-ENTMCNC: 33.6 GM/DL (ref 32.2–35.5)
MCV RBC AUTO: 92 FL (ref 81–99)
MONOCYTES ABSOLUTE: 0.3 THOU/MM3 (ref 0.4–1.3)
MONOCYTES NFR BLD AUTO: 4.6 %
NEUTROPHILS ABSOLUTE: 4.5 THOU/MM3 (ref 1.8–7.7)
NEUTROPHILS NFR BLD AUTO: 71.2 %
NRBC BLD AUTO-RTO: 0 /100 WBC
PLATELET # BLD AUTO: 134 THOU/MM3 (ref 130–400)
PMV BLD AUTO: 12.9 FL (ref 9.4–12.4)
RBC # BLD AUTO: 4.98 MILL/MM3 (ref 4.2–5.4)
T4 FREE SERPL-MCNC: 1.1 NG/DL (ref 0.92–1.68)
TSH SERPL DL<=0.05 MIU/L-ACNC: 5.32 UIU/ML (ref 0.27–4.2)
VIT B12 SERPL-MCNC: 295 PG/ML (ref 232–1245)
WBC # BLD AUTO: 6.3 THOU/MM3 (ref 4.8–10.8)

## 2025-06-11 ENCOUNTER — TELEPHONE (OUTPATIENT)
Dept: FAMILY MEDICINE CLINIC | Age: 80
End: 2025-06-11

## 2025-06-20 NOTE — TELEPHONE ENCOUNTER
Patient notified but states Anxiety and depression have not been doing well and asked if there is alternative medication she can try instead.       WM-A    Call patient back, she knows this will be addressed next week.

## 2025-07-07 RX ORDER — BUSPIRONE HYDROCHLORIDE 15 MG/1
15 TABLET ORAL DAILY
Qty: 90 TABLET | Refills: 0 | Status: SHIPPED | OUTPATIENT
Start: 2025-07-07

## 2025-08-18 DIAGNOSIS — F33.1 MODERATE EPISODE OF RECURRENT MAJOR DEPRESSIVE DISORDER (HCC): ICD-10-CM

## 2025-08-18 RX ORDER — DULOXETIN HYDROCHLORIDE 60 MG/1
60 CAPSULE, DELAYED RELEASE ORAL DAILY
Qty: 180 CAPSULE | Refills: 3 | Status: SHIPPED | OUTPATIENT
Start: 2025-08-18

## 2025-08-21 DIAGNOSIS — E11.65 TYPE 2 DIABETES MELLITUS WITH HYPERGLYCEMIA, WITHOUT LONG-TERM CURRENT USE OF INSULIN (HCC): ICD-10-CM

## 2025-08-21 RX ORDER — DULAGLUTIDE 3 MG/.5ML
INJECTION, SOLUTION SUBCUTANEOUS
Qty: 12 ML | Refills: 0 | Status: SHIPPED | OUTPATIENT
Start: 2025-08-21

## 2025-08-30 DIAGNOSIS — E11.65 TYPE 2 DIABETES MELLITUS WITH HYPERGLYCEMIA, WITHOUT LONG-TERM CURRENT USE OF INSULIN (HCC): ICD-10-CM

## 2025-09-02 RX ORDER — DULAGLUTIDE 3 MG/.5ML
INJECTION, SOLUTION SUBCUTANEOUS
Qty: 12 ML | Refills: 0 | Status: SHIPPED | OUTPATIENT
Start: 2025-09-02